# Patient Record
Sex: MALE | Race: WHITE | NOT HISPANIC OR LATINO | Employment: UNEMPLOYED | URBAN - METROPOLITAN AREA
[De-identification: names, ages, dates, MRNs, and addresses within clinical notes are randomized per-mention and may not be internally consistent; named-entity substitution may affect disease eponyms.]

---

## 2022-10-04 ENCOUNTER — TELEPHONE (OUTPATIENT)
Dept: TRANSPLANT | Facility: CLINIC | Age: 47
End: 2022-10-04

## 2022-10-04 DIAGNOSIS — I50.9 CONGESTIVE HEART FAILURE, UNSPECIFIED HF CHRONICITY, UNSPECIFIED HEART FAILURE TYPE: Primary | ICD-10-CM

## 2022-10-17 ENCOUNTER — LAB VISIT (OUTPATIENT)
Dept: LAB | Facility: HOSPITAL | Age: 47
End: 2022-10-17
Attending: INTERNAL MEDICINE
Payer: MEDICAID

## 2022-10-17 ENCOUNTER — OFFICE VISIT (OUTPATIENT)
Dept: TRANSPLANT | Facility: CLINIC | Age: 47
End: 2022-10-17
Payer: MEDICAID

## 2022-10-17 VITALS
SYSTOLIC BLOOD PRESSURE: 135 MMHG | BODY MASS INDEX: 27.13 KG/M2 | DIASTOLIC BLOOD PRESSURE: 77 MMHG | HEIGHT: 71 IN | WEIGHT: 193.81 LBS | HEART RATE: 80 BPM

## 2022-10-17 DIAGNOSIS — I50.9 CONGESTIVE HEART FAILURE, UNSPECIFIED HF CHRONICITY, UNSPECIFIED HEART FAILURE TYPE: ICD-10-CM

## 2022-10-17 DIAGNOSIS — N18.31 STAGE 3A CHRONIC KIDNEY DISEASE: ICD-10-CM

## 2022-10-17 DIAGNOSIS — F17.201 TOBACCO ABUSE, IN REMISSION: ICD-10-CM

## 2022-10-17 DIAGNOSIS — I48.91 ATRIAL FIBRILLATION, UNSPECIFIED TYPE: ICD-10-CM

## 2022-10-17 DIAGNOSIS — Z95.1 HX OF CABG: ICD-10-CM

## 2022-10-17 DIAGNOSIS — F19.21 POLYSUBSTANCE DEPENDENCE, NON-OPIOID, IN REMISSION: ICD-10-CM

## 2022-10-17 DIAGNOSIS — I21.9 MYOCARDIAL INFARCTION, UNSPECIFIED MI TYPE, UNSPECIFIED ARTERY: ICD-10-CM

## 2022-10-17 DIAGNOSIS — I50.42 CHRONIC COMBINED SYSTOLIC AND DIASTOLIC HEART FAILURE: Primary | ICD-10-CM

## 2022-10-17 DIAGNOSIS — R26.2 AMBULATORY DYSFUNCTION: ICD-10-CM

## 2022-10-17 DIAGNOSIS — I25.5 ISCHEMIC CARDIOMYOPATHY: ICD-10-CM

## 2022-10-17 DIAGNOSIS — Z86.39 HISTORY OF DIABETES MELLITUS: ICD-10-CM

## 2022-10-17 DIAGNOSIS — D63.8 ANEMIA IN OTHER CHRONIC DISEASES CLASSIFIED ELSEWHERE: ICD-10-CM

## 2022-10-17 DIAGNOSIS — Z98.890 STATUS POST CREATION OF PERICARDIAL WINDOW: ICD-10-CM

## 2022-10-17 DIAGNOSIS — I25.118 CORONARY ARTERY DISEASE OF NATIVE ARTERY OF NATIVE HEART WITH STABLE ANGINA PECTORIS: ICD-10-CM

## 2022-10-17 LAB
ALBUMIN SERPL BCP-MCNC: 3.8 G/DL (ref 3.5–5.2)
ALP SERPL-CCNC: 91 U/L (ref 55–135)
ALT SERPL W/O P-5'-P-CCNC: 9 U/L (ref 10–44)
ANION GAP SERPL CALC-SCNC: 10 MMOL/L (ref 8–16)
AST SERPL-CCNC: 17 U/L (ref 10–40)
BILIRUB SERPL-MCNC: 0.9 MG/DL (ref 0.1–1)
BNP SERPL-MCNC: 1753 PG/ML (ref 0–99)
BUN SERPL-MCNC: 43 MG/DL (ref 6–20)
CALCIUM SERPL-MCNC: 9.9 MG/DL (ref 8.7–10.5)
CHLORIDE SERPL-SCNC: 104 MMOL/L (ref 95–110)
CO2 SERPL-SCNC: 23 MMOL/L (ref 23–29)
CREAT SERPL-MCNC: 1.7 MG/DL (ref 0.5–1.4)
EST. GFR  (NO RACE VARIABLE): 49.1 ML/MIN/1.73 M^2
GLUCOSE SERPL-MCNC: 91 MG/DL (ref 70–110)
MAGNESIUM SERPL-MCNC: 2.2 MG/DL (ref 1.6–2.6)
POTASSIUM SERPL-SCNC: 3.8 MMOL/L (ref 3.5–5.1)
PROT SERPL-MCNC: 7 G/DL (ref 6–8.4)
SODIUM SERPL-SCNC: 137 MMOL/L (ref 136–145)
TSH SERPL DL<=0.005 MIU/L-ACNC: 1.65 UIU/ML (ref 0.4–4)

## 2022-10-17 PROCEDURE — 99214 OFFICE O/P EST MOD 30 MIN: CPT | Mod: PBBFAC | Performed by: INTERNAL MEDICINE

## 2022-10-17 PROCEDURE — 3075F SYST BP GE 130 - 139MM HG: CPT | Mod: CPTII,,, | Performed by: INTERNAL MEDICINE

## 2022-10-17 PROCEDURE — 84443 ASSAY THYROID STIM HORMONE: CPT | Performed by: INTERNAL MEDICINE

## 2022-10-17 PROCEDURE — 99999 PR PBB SHADOW E&M-EST. PATIENT-LVL IV: CPT | Mod: PBBFAC,,, | Performed by: INTERNAL MEDICINE

## 2022-10-17 PROCEDURE — 80053 COMPREHEN METABOLIC PANEL: CPT | Performed by: INTERNAL MEDICINE

## 2022-10-17 PROCEDURE — 3075F PR MOST RECENT SYSTOLIC BLOOD PRESS GE 130-139MM HG: ICD-10-PCS | Mod: CPTII,,, | Performed by: INTERNAL MEDICINE

## 2022-10-17 PROCEDURE — 83735 ASSAY OF MAGNESIUM: CPT | Performed by: INTERNAL MEDICINE

## 2022-10-17 PROCEDURE — 1160F RVW MEDS BY RX/DR IN RCRD: CPT | Mod: CPTII,,, | Performed by: INTERNAL MEDICINE

## 2022-10-17 PROCEDURE — 99204 OFFICE O/P NEW MOD 45 MIN: CPT | Mod: S$PBB,,, | Performed by: INTERNAL MEDICINE

## 2022-10-17 PROCEDURE — 3078F PR MOST RECENT DIASTOLIC BLOOD PRESSURE < 80 MM HG: ICD-10-PCS | Mod: CPTII,,, | Performed by: INTERNAL MEDICINE

## 2022-10-17 PROCEDURE — 99999 PR PBB SHADOW E&M-EST. PATIENT-LVL IV: ICD-10-PCS | Mod: PBBFAC,,, | Performed by: INTERNAL MEDICINE

## 2022-10-17 PROCEDURE — 1159F MED LIST DOCD IN RCRD: CPT | Mod: CPTII,,, | Performed by: INTERNAL MEDICINE

## 2022-10-17 PROCEDURE — 99204 PR OFFICE/OUTPT VISIT, NEW, LEVL IV, 45-59 MIN: ICD-10-PCS | Mod: S$PBB,,, | Performed by: INTERNAL MEDICINE

## 2022-10-17 PROCEDURE — 83880 ASSAY OF NATRIURETIC PEPTIDE: CPT | Performed by: INTERNAL MEDICINE

## 2022-10-17 PROCEDURE — 36415 COLL VENOUS BLD VENIPUNCTURE: CPT | Performed by: INTERNAL MEDICINE

## 2022-10-17 PROCEDURE — 4010F ACE/ARB THERAPY RXD/TAKEN: CPT | Mod: CPTII,,, | Performed by: INTERNAL MEDICINE

## 2022-10-17 PROCEDURE — 1160F PR REVIEW ALL MEDS BY PRESCRIBER/CLIN PHARMACIST DOCUMENTED: ICD-10-PCS | Mod: CPTII,,, | Performed by: INTERNAL MEDICINE

## 2022-10-17 PROCEDURE — 1159F PR MEDICATION LIST DOCUMENTED IN MEDICAL RECORD: ICD-10-PCS | Mod: CPTII,,, | Performed by: INTERNAL MEDICINE

## 2022-10-17 PROCEDURE — 4010F PR ACE/ARB THEARPY RXD/TAKEN: ICD-10-PCS | Mod: CPTII,,, | Performed by: INTERNAL MEDICINE

## 2022-10-17 PROCEDURE — 3078F DIAST BP <80 MM HG: CPT | Mod: CPTII,,, | Performed by: INTERNAL MEDICINE

## 2022-10-17 RX ORDER — ONDANSETRON HYDROCHLORIDE 8 MG/1
8 TABLET, FILM COATED ORAL 2 TIMES DAILY
COMMUNITY
End: 2023-07-07

## 2022-10-17 RX ORDER — VALSARTAN 40 MG/1
40 TABLET ORAL 2 TIMES DAILY
Qty: 180 TABLET | Refills: 3 | Status: SHIPPED | OUTPATIENT
Start: 2022-10-17 | End: 2022-10-25

## 2022-10-17 RX ORDER — PANTOPRAZOLE SODIUM 40 MG/1
40 TABLET, DELAYED RELEASE ORAL DAILY
COMMUNITY
Start: 2022-09-13 | End: 2023-07-07

## 2022-10-17 RX ORDER — POTASSIUM CHLORIDE 20 MEQ/1
20 TABLET, EXTENDED RELEASE ORAL 4 TIMES DAILY
COMMUNITY
Start: 2022-09-13 | End: 2022-10-17

## 2022-10-17 RX ORDER — METOPROLOL SUCCINATE 25 MG/1
12.5 TABLET, EXTENDED RELEASE ORAL 2 TIMES DAILY
Qty: 30 TABLET | Refills: 11 | Status: SHIPPED | OUTPATIENT
Start: 2022-10-17 | End: 2023-03-06 | Stop reason: SDUPTHER

## 2022-10-17 RX ORDER — BECLOMETHASONE DIPROPIONATE HFA 40 UG/1
80 AEROSOL, METERED RESPIRATORY (INHALATION) 2 TIMES DAILY
COMMUNITY

## 2022-10-17 RX ORDER — SPIRONOLACTONE 25 MG/1
25 TABLET ORAL DAILY
Qty: 30 TABLET | Refills: 11 | Status: SHIPPED | OUTPATIENT
Start: 2022-10-17 | End: 2022-10-25

## 2022-10-17 RX ORDER — FUROSEMIDE 40 MG/1
40 TABLET ORAL 2 TIMES DAILY
COMMUNITY
Start: 2022-09-13 | End: 2022-10-17 | Stop reason: SDUPTHER

## 2022-10-17 RX ORDER — ESCITALOPRAM OXALATE 20 MG/1
20 TABLET ORAL DAILY
COMMUNITY
Start: 2022-10-03

## 2022-10-17 RX ORDER — CLOPIDOGREL BISULFATE 75 MG/1
75 TABLET ORAL DAILY
COMMUNITY
End: 2023-07-07

## 2022-10-17 RX ORDER — FUROSEMIDE 40 MG/1
80 TABLET ORAL 2 TIMES DAILY
Qty: 90 TABLET | Refills: 3 | Status: SHIPPED | OUTPATIENT
Start: 2022-10-17 | End: 2022-10-25

## 2022-10-17 RX ORDER — DIGOXIN 125 MCG
0.12 TABLET ORAL DAILY
Status: ON HOLD | COMMUNITY
Start: 2022-09-13 | End: 2023-10-05 | Stop reason: HOSPADM

## 2022-10-17 RX ORDER — FERROUS SULFATE 325(65) MG
325 TABLET, DELAYED RELEASE (ENTERIC COATED) ORAL DAILY
COMMUNITY

## 2022-10-17 RX ORDER — ISOSORBIDE MONONITRATE 30 MG/1
30 TABLET, EXTENDED RELEASE ORAL DAILY
COMMUNITY
End: 2023-09-26 | Stop reason: SDUPTHER

## 2022-10-17 RX ORDER — CALCITRIOL 0.25 UG/1
0.25 CAPSULE ORAL
COMMUNITY
End: 2023-07-07

## 2022-10-17 RX ORDER — ATORVASTATIN CALCIUM 40 MG/1
40 TABLET, FILM COATED ORAL DAILY
COMMUNITY
Start: 2022-10-03

## 2022-10-17 NOTE — PATIENT INSTRUCTIONS
Follow new medication changes prescribed here. Stop taking metoprolol tartrate and potassium supplements    Recommend 2 gram sodium restriction and 1500cc fluid restriction.  Encourage physical activity with graded exercise program.  Requested patient to weigh themselves daily, and to notify us if their weight increases by more than 3 lbs in 1 day or 5 lbs in 1 week.
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
## 2022-10-24 ENCOUNTER — TELEPHONE (OUTPATIENT)
Dept: TRANSPLANT | Facility: CLINIC | Age: 47
End: 2022-10-24
Payer: MEDICAID

## 2022-10-24 NOTE — TELEPHONE ENCOUNTER
"3:30 pm:  f/U on todays nurse lab reminder   See Dr. Daniel's 10/17/22 clinic note w/ reason for this lab  Pt had outside BMP and NTproBNP done today and just received results  Na/K:  138/4.3    Cl/Co2;  100  /25    Bun/Cr:  68/2.6  On 10/17:  bun/Cr:  43/1.7  Today had NtproBNP:  4233    Called and spoke with pt at this time for assessment:  Meds:  Pt is taking Valsartan 40 mg bid  (increased 10/18)  Furosemide 80 mg BID (increased 10/18)  Spironolactone 25 mg once daily ( started 10/18)  HF assessment:  Weighs dly ,but does not write them down ( asked pt to keep a every am log and will do ) pt certain home wt day of visit 10/17 was:  188.4 and today 186.5   Not lower last week, but has been higher)   Still w/ some heavy breathing w/ activity, but better  Initially U.O. increased w/ Lasix increase, but has slowed up  No peripheral edema  Slight abdominal distention-none right now    In addition: pt reports he  gets dizzy when gets up sometimes and also "balance is off at times"  These 2 symptoms started post 10/17/22 clinic visit    Told pt I was going to go to the clinic to see if Dr. Daniel available to review all with her , if not, she may not get back with me until after hours. Told pt if I do not speak with him today to not take the pm dose of Valsartan and Furosemide today until we hear from Dr. Daniel -pt voice understanding  As a side:  asked pt and he usually take furosemide 7 am and 8 pm-told pt he can take his 2nd furosemide dose @ 3 or 4  rather than late . ( But not today)      To clinic and then to office to review w/ Dr. Daniel,: I was  told she just left for the day  Sent her a phone message w/ pts bun/cr values today from outside lab and these values are up from 10/17, also that I have instructed pt to hold Valsartan and Lasix this evening   IN addition, left in phone message I will enter all in a note and route to her for her review and plan.   "

## 2022-10-25 DIAGNOSIS — I50.42 CHRONIC COMBINED SYSTOLIC AND DIASTOLIC HEART FAILURE: ICD-10-CM

## 2022-10-25 RX ORDER — SPIRONOLACTONE 25 MG/1
25 TABLET ORAL DAILY
Qty: 30 TABLET | Refills: 11
Start: 2022-10-25 | End: 2023-01-27

## 2022-10-25 RX ORDER — FUROSEMIDE 40 MG/1
80 TABLET ORAL 2 TIMES DAILY
Qty: 90 TABLET | Refills: 3
Start: 2022-10-25 | End: 2022-10-27

## 2022-10-25 RX ORDER — VALSARTAN 40 MG/1
40 TABLET ORAL DAILY
Qty: 90 TABLET | Refills: 3
Start: 2022-10-25 | End: 2023-03-06 | Stop reason: SDUPTHER

## 2022-10-25 NOTE — TELEPHONE ENCOUNTER
1130 am:  Discussed further with dr. Fredy RAMIREZ: Dr. Pimentel/Sid, RN:    Decrease Valsartan to 40 mg once daily starting today, stop taking spironolactone for now, do not take Lasix until after review of lab 10/27. Repeat BMP and ntproBNP this Thursday 10/27 am  Called pt and reviewed all of these orders in detail and pt was able to repeat back to me correctly  Pt in agreement to have lab work this Thursday 10/27 and requests to be done by [facility].  Told pt needed to have done early in the am and will have our medical assistant to arrange this  pt in agreement to am lab in that he has an afternoon  that day  Message sent to [facility] to arrange this lab by  and if not able to do so that am to let me know    Additionally assessment from this am:  Wt:  185.4 ( down a pound)  stated still w/ dizziness w. Change of position , but not as bad    Asked pt to strictly follow the 50 ounces of liquids per 24 hour especially since Furosemide on hold as well as a 2000 mg /24 hour sodium restriction  pt agreed to do so.   Asked pt to notify this office for a wt gain > 3 pounds overnight or any other signs of fluid  pt agreed    Confirmed yesterday and again today, pt has this office phone number. If needed.

## 2022-10-27 DIAGNOSIS — I50.42 CHRONIC COMBINED SYSTOLIC AND DIASTOLIC HEART FAILURE: ICD-10-CM

## 2022-10-27 RX ORDER — FUROSEMIDE 40 MG/1
80 TABLET ORAL 2 TIMES DAILY
Qty: 90 TABLET | Refills: 3
Start: 2022-10-27 | End: 2023-01-27

## 2022-10-27 NOTE — TELEPHONE ENCOUNTER
2;40 pm:  F/U on [facility] nurse lab phone reminder  See my previous notes w/ orders  Outside labs received today from [facility] collection:  Na/K:  140/4.5    Cl/Co2:  105/25     Bun/Cr:  42/1.75  Called and spoke w/ pt at this time for assessment pt confirmed he is taking Valsartan 40 mg once daily and has not taken Spironolactone or Lasix as directed  Wts: 10/25:  185.4  10/26:  187.4  1027 : today;  191.6  No sob, or swelling   Does have decreased u.o.    2:50 pm  Reviewed w/ Dr. Fredy RAMIREZ: Dr. Daniel/Sid, RN: continue Valsartan 40 mg once daily     Resume Lasis 80 mg BID ( and had confirmed with pt , this is the dose he was taking)   Continue to hold Spironolactone for now    Repeat BMP NTproBNP 10/31/22     10/27/22  3:00 pm: Called pt and reviewed these instructions in detail  He was able to repeat back to me correctly   Will have HH draw labs 10/31/22 am.     1027/22 3:30 pm:  Message to HF LAURA Mark to arrange HH labs as above 10/31/22 w/ reason and add HF assessment to that lab reminder.

## 2022-10-31 ENCOUNTER — TELEPHONE (OUTPATIENT)
Dept: TRANSPLANT | Facility: CLINIC | Age: 47
End: 2022-10-31
Payer: MEDICAID

## 2022-10-31 NOTE — TELEPHONE ENCOUNTER
Spoke with patient today, Informed him that the outside labs have been received and reviewed.  Weights since Friday are 189.0, Saturday 189.4 lb, Sunday 191.4 lb, and today 189.6 lb. States he does not noted any swelling in his legs or abdomen at this time. No shortness of breath unless he is carrying something or exerting himself.   States he is taking 2 40 mg (80 mg total) lasix bib, Valsartan 50 mg once a day. States he is not taking spironalactone, the last dose was on 10/25/22.          ---- Message from Carolina Vargas RN sent at 10/31/2022 12:35 PM CDT -----  Please call pt for HF assessment  -wts,breathing, edema, etc.  Let him know I have received and reviewed his outside labs from this am.     See my recent NN    Labs today from outside w/ slightly higher cr? 1.92    Confirm w/ pt taking Valsartan 40  once daily  Ask pt dose and how often he is taking lasix   And verify he has been holding Spironolactone    Please doc all and let me know when done: I will review and report out    Thanks  Madelyn

## 2022-10-31 NOTE — TELEPHONE ENCOUNTER
1230 pm:  F/U on todays nurse lab phone reminder  Just received outside lab results  Na/K:  135/3.5     Cl/CO2:  97/26     Bun/Cr:  36/1.92  Nt proBNP:  6138    See my previous nn w/ labs/meds, etc    Alem RN calling pt for HF assessment w/ wts,symptoms, meds, etc.     Here is her assessment:  Wt still up from 10/25 : 165 -186 range    Spoke with patient today, Informed him that the outside labs have been received and reviewed.  Weights since Friday are 189.0, Saturday 189.4 lb, Sunday 191.4 lb, and today 189.6 lb. States he does not noted any swelling in his legs or abdomen at this time. No shortness of breath unless he is carrying something or exerting himself.   States he is taking 2 40 mg (80 mg total) lasix bib, Valsartan 50 mg once a day. States he is not taking spironalactone, the last dose was on 10/25/22.    Pt is taking Lasix 80 mg bid and Valsartan 40 ( not 50 mg ) once daily   is not taking aldactone    Due to RTC 11/18/22 to see Dr. Daniel    Will route to Dr. Daniel for her review upon her return to the office 11/2/22

## 2022-12-15 ENCOUNTER — PATIENT MESSAGE (OUTPATIENT)
Dept: ADMINISTRATIVE | Facility: OTHER | Age: 47
End: 2022-12-15
Payer: MEDICAID

## 2023-01-16 PROBLEM — I21.9 MI (MYOCARDIAL INFARCTION): Status: RESOLVED | Noted: 2022-10-17 | Resolved: 2023-01-16
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
## 2023-01-27 ENCOUNTER — LAB VISIT (OUTPATIENT)
Dept: LAB | Facility: HOSPITAL | Age: 48
End: 2023-01-27
Attending: INTERNAL MEDICINE
Payer: MEDICAID

## 2023-01-27 ENCOUNTER — OFFICE VISIT (OUTPATIENT)
Dept: TRANSPLANT | Facility: CLINIC | Age: 48
End: 2023-01-27
Payer: MEDICAID

## 2023-01-27 ENCOUNTER — TELEPHONE (OUTPATIENT)
Dept: TRANSPLANT | Facility: CLINIC | Age: 48
End: 2023-01-27

## 2023-01-27 VITALS
HEART RATE: 83 BPM | BODY MASS INDEX: 29.63 KG/M2 | SYSTOLIC BLOOD PRESSURE: 137 MMHG | HEIGHT: 71 IN | DIASTOLIC BLOOD PRESSURE: 43 MMHG | WEIGHT: 211.63 LBS

## 2023-01-27 DIAGNOSIS — I25.118 CORONARY ARTERY DISEASE OF NATIVE ARTERY OF NATIVE HEART WITH STABLE ANGINA PECTORIS: ICD-10-CM

## 2023-01-27 DIAGNOSIS — Z98.890 STATUS POST CREATION OF PERICARDIAL WINDOW: ICD-10-CM

## 2023-01-27 DIAGNOSIS — D63.8 ANEMIA IN OTHER CHRONIC DISEASES CLASSIFIED ELSEWHERE: ICD-10-CM

## 2023-01-27 DIAGNOSIS — Z86.39 HISTORY OF DIABETES MELLITUS: ICD-10-CM

## 2023-01-27 DIAGNOSIS — F17.201 TOBACCO ABUSE, IN REMISSION: ICD-10-CM

## 2023-01-27 DIAGNOSIS — Z95.1 HX OF CABG: ICD-10-CM

## 2023-01-27 DIAGNOSIS — I50.42 CHRONIC COMBINED SYSTOLIC AND DIASTOLIC HEART FAILURE: ICD-10-CM

## 2023-01-27 DIAGNOSIS — I48.91 ATRIAL FIBRILLATION, UNSPECIFIED TYPE: ICD-10-CM

## 2023-01-27 DIAGNOSIS — N18.31 STAGE 3A CHRONIC KIDNEY DISEASE: ICD-10-CM

## 2023-01-27 DIAGNOSIS — F19.21 POLYSUBSTANCE DEPENDENCE, NON-OPIOID, IN REMISSION: ICD-10-CM

## 2023-01-27 DIAGNOSIS — I50.42 CHRONIC COMBINED SYSTOLIC AND DIASTOLIC HEART FAILURE: Primary | ICD-10-CM

## 2023-01-27 DIAGNOSIS — I25.5 ISCHEMIC CARDIOMYOPATHY: ICD-10-CM

## 2023-01-27 LAB
ALBUMIN SERPL BCP-MCNC: 4 G/DL (ref 3.5–5.2)
ALP SERPL-CCNC: 106 U/L (ref 55–135)
ALT SERPL W/O P-5'-P-CCNC: 12 U/L (ref 10–44)
ANION GAP SERPL CALC-SCNC: 11 MMOL/L (ref 8–16)
AST SERPL-CCNC: 20 U/L (ref 10–40)
BASOPHILS # BLD AUTO: 0.1 K/UL (ref 0–0.2)
BASOPHILS NFR BLD: 1.1 % (ref 0–1.9)
BILIRUB SERPL-MCNC: 1.1 MG/DL (ref 0.1–1)
BNP SERPL-MCNC: 1772 PG/ML (ref 0–99)
BUN SERPL-MCNC: 33 MG/DL (ref 6–20)
CALCIUM SERPL-MCNC: 9.4 MG/DL (ref 8.7–10.5)
CHLORIDE SERPL-SCNC: 100 MMOL/L (ref 95–110)
CO2 SERPL-SCNC: 25 MMOL/L (ref 23–29)
CREAT SERPL-MCNC: 1.7 MG/DL (ref 0.5–1.4)
DIFFERENTIAL METHOD: ABNORMAL
EOSINOPHIL # BLD AUTO: 0.3 K/UL (ref 0–0.5)
EOSINOPHIL NFR BLD: 2.9 % (ref 0–8)
ERYTHROCYTE [DISTWIDTH] IN BLOOD BY AUTOMATED COUNT: 19.3 % (ref 11.5–14.5)
EST. GFR  (NO RACE VARIABLE): 49.1 ML/MIN/1.73 M^2
GLUCOSE SERPL-MCNC: 89 MG/DL (ref 70–110)
HCT VFR BLD AUTO: 41 % (ref 40–54)
HGB BLD-MCNC: 12.6 G/DL (ref 14–18)
IMM GRANULOCYTES # BLD AUTO: 0.01 K/UL (ref 0–0.04)
IMM GRANULOCYTES NFR BLD AUTO: 0.1 % (ref 0–0.5)
LYMPHOCYTES # BLD AUTO: 1.7 K/UL (ref 1–4.8)
LYMPHOCYTES NFR BLD: 18 % (ref 18–48)
MAGNESIUM SERPL-MCNC: 2.3 MG/DL (ref 1.6–2.6)
MCH RBC QN AUTO: 23.6 PG (ref 27–31)
MCHC RBC AUTO-ENTMCNC: 30.7 G/DL (ref 32–36)
MCV RBC AUTO: 77 FL (ref 82–98)
MONOCYTES # BLD AUTO: 1.1 K/UL (ref 0.3–1)
MONOCYTES NFR BLD: 11.1 % (ref 4–15)
NEUTROPHILS # BLD AUTO: 6.3 K/UL (ref 1.8–7.7)
NEUTROPHILS NFR BLD: 66.8 % (ref 38–73)
NRBC BLD-RTO: 0 /100 WBC
PLATELET # BLD AUTO: 274 K/UL (ref 150–450)
PMV BLD AUTO: 10.8 FL (ref 9.2–12.9)
POTASSIUM SERPL-SCNC: 3.6 MMOL/L (ref 3.5–5.1)
PROT SERPL-MCNC: 7.7 G/DL (ref 6–8.4)
RBC # BLD AUTO: 5.35 M/UL (ref 4.6–6.2)
SODIUM SERPL-SCNC: 136 MMOL/L (ref 136–145)
WBC # BLD AUTO: 9.43 K/UL (ref 3.9–12.7)

## 2023-01-27 PROCEDURE — 3008F PR BODY MASS INDEX (BMI) DOCUMENTED: ICD-10-PCS | Mod: CPTII,,, | Performed by: INTERNAL MEDICINE

## 2023-01-27 PROCEDURE — 1159F PR MEDICATION LIST DOCUMENTED IN MEDICAL RECORD: ICD-10-PCS | Mod: CPTII,,, | Performed by: INTERNAL MEDICINE

## 2023-01-27 PROCEDURE — 99213 OFFICE O/P EST LOW 20 MIN: CPT | Mod: PBBFAC | Performed by: INTERNAL MEDICINE

## 2023-01-27 PROCEDURE — 36415 COLL VENOUS BLD VENIPUNCTURE: CPT | Performed by: INTERNAL MEDICINE

## 2023-01-27 PROCEDURE — 99214 PR OFFICE/OUTPT VISIT, EST, LEVL IV, 30-39 MIN: ICD-10-PCS | Mod: S$PBB,,, | Performed by: INTERNAL MEDICINE

## 2023-01-27 PROCEDURE — 1159F MED LIST DOCD IN RCRD: CPT | Mod: CPTII,,, | Performed by: INTERNAL MEDICINE

## 2023-01-27 PROCEDURE — 85025 COMPLETE CBC W/AUTO DIFF WBC: CPT | Performed by: INTERNAL MEDICINE

## 2023-01-27 PROCEDURE — 83880 ASSAY OF NATRIURETIC PEPTIDE: CPT | Performed by: INTERNAL MEDICINE

## 2023-01-27 PROCEDURE — 3075F SYST BP GE 130 - 139MM HG: CPT | Mod: CPTII,,, | Performed by: INTERNAL MEDICINE

## 2023-01-27 PROCEDURE — 3008F BODY MASS INDEX DOCD: CPT | Mod: CPTII,,, | Performed by: INTERNAL MEDICINE

## 2023-01-27 PROCEDURE — 3078F DIAST BP <80 MM HG: CPT | Mod: CPTII,,, | Performed by: INTERNAL MEDICINE

## 2023-01-27 PROCEDURE — 3075F PR MOST RECENT SYSTOLIC BLOOD PRESS GE 130-139MM HG: ICD-10-PCS | Mod: CPTII,,, | Performed by: INTERNAL MEDICINE

## 2023-01-27 PROCEDURE — 4010F PR ACE/ARB THEARPY RXD/TAKEN: ICD-10-PCS | Mod: CPTII,,, | Performed by: INTERNAL MEDICINE

## 2023-01-27 PROCEDURE — 4010F ACE/ARB THERAPY RXD/TAKEN: CPT | Mod: CPTII,,, | Performed by: INTERNAL MEDICINE

## 2023-01-27 PROCEDURE — 99214 OFFICE O/P EST MOD 30 MIN: CPT | Mod: S$PBB,,, | Performed by: INTERNAL MEDICINE

## 2023-01-27 PROCEDURE — 1160F RVW MEDS BY RX/DR IN RCRD: CPT | Mod: CPTII,,, | Performed by: INTERNAL MEDICINE

## 2023-01-27 PROCEDURE — 1160F PR REVIEW ALL MEDS BY PRESCRIBER/CLIN PHARMACIST DOCUMENTED: ICD-10-PCS | Mod: CPTII,,, | Performed by: INTERNAL MEDICINE

## 2023-01-27 PROCEDURE — 80053 COMPREHEN METABOLIC PANEL: CPT | Performed by: INTERNAL MEDICINE

## 2023-01-27 PROCEDURE — 83735 ASSAY OF MAGNESIUM: CPT | Performed by: INTERNAL MEDICINE

## 2023-01-27 PROCEDURE — 3078F PR MOST RECENT DIASTOLIC BLOOD PRESSURE < 80 MM HG: ICD-10-PCS | Mod: CPTII,,, | Performed by: INTERNAL MEDICINE

## 2023-01-27 PROCEDURE — 99999 PR PBB SHADOW E&M-EST. PATIENT-LVL III: CPT | Mod: PBBFAC,,, | Performed by: INTERNAL MEDICINE

## 2023-01-27 PROCEDURE — 99999 PR PBB SHADOW E&M-EST. PATIENT-LVL III: ICD-10-PCS | Mod: PBBFAC,,, | Performed by: INTERNAL MEDICINE

## 2023-01-27 RX ORDER — DIAZEPAM 2 MG/1
2 TABLET ORAL 3 TIMES DAILY PRN
COMMUNITY
Start: 2022-11-09

## 2023-01-27 RX ORDER — DAPAGLIFLOZIN 10 MG/1
10 TABLET, FILM COATED ORAL DAILY
Qty: 30 TABLET | Refills: 3 | Status: SHIPPED | OUTPATIENT
Start: 2023-01-27 | End: 2023-09-26 | Stop reason: SDUPTHER

## 2023-01-27 RX ORDER — SPIRONOLACTONE 25 MG/1
25 TABLET ORAL DAILY
Qty: 30 TABLET | Refills: 11
Start: 2023-01-27 | End: 2023-04-04

## 2023-01-27 RX ORDER — FUROSEMIDE 40 MG/1
80 TABLET ORAL DAILY
Qty: 90 TABLET | Refills: 3
Start: 2023-01-27 | End: 2023-04-04

## 2023-01-27 NOTE — TELEPHONE ENCOUNTER
2:00 pm:   Received and reviewed this message  Just called and spoke w/ pt   He was driving home.  Told pt whom I am, office w/ and reason for call is that Dr. Daniel reviewed his labs which resulted after he left the clinic and Dr. Daniel wanted to make medication changes.    Asked pt if someone was with him for me to give new medication information to him and he said there was  Informed pt to restart Spironolactone 25 mg once daily and decrease Lasix to 80 mg once daily once he starts it  Asked after noting neither medications routed to a pharmacy and pt told me he does have Spironolactone in his home and does not need a prescription   Pt voiced understanding of both of these directions.     Told pt she would like to start a new medication: and spelled it w/ directions : Farxiga 10 mg once daily and told pt what this is for  And was sent to the [facility] in [place] on [address] and he said that was the correct place    Informed pt our appt  told me she is sending lab request to his HH agency and asking they draw lab work on Thursday of next week , rather than the scheduled day they see pt on Friday so we will have a better chance of getting the results. Pt understood and will also follow up re this w/ HH    Pt voiced understanding of all directions and has no questions.    Asked pt to weigh daily and notify this office for a wt gain >3 pds o/n and/or > 5 pds in a week or any other signs of fluid : inc sob, heavy breathing swelling to leg/ankles/feet or abdomen and pt agreed to do so           ----- Message from Monique Daniel DO sent at 1/27/2023  1:41 PM CST -----  Regarding: Labs  Labs just returned on Mr. Lopez. Will restart spironolactone with reducing Lasix to 80 mg daily but also start Farxiga 10 mg daily this time and follow repeat labs locally next week.    Marnie, can you help schedule local labs next week?    Thanks,  Monique

## 2023-02-03 ENCOUNTER — TELEPHONE (OUTPATIENT)
Dept: TRANSPLANT | Facility: CLINIC | Age: 48
End: 2023-02-03
Payer: MEDICAID

## 2023-02-03 NOTE — TELEPHONE ENCOUNTER
3:15 pm:  f/u on yesterdays nurse lab phone reminder  Received outside lab results today via fax    See Dr. Daniel's 1/27 clinic note and my 1/27/23 NN    Resutls from 2/2/23 are:  Na/K:  138/4.5    Cl/Co2:  100/30.9    bun/Cr:  46/2.03  T. Bili:  0.86   ntproBNP:  3480      Noted Cr:  1/27 : 1.7  BNP done not nt pro bnp on 1/27.    Just placed call to pt to review lab results and obtain assessment and review med changes  NA at 1st number list, lvm stating my name, office w/ , day, date time and important he return my call to review lab results which have changed and see how he is doing  2nd number list id out of service  3rd number listed is [facility] and I did ask if they service this pt and they do not.

## 2023-03-02 ENCOUNTER — DOCUMENT SCAN (OUTPATIENT)
Dept: HOME HEALTH SERVICES | Facility: HOSPITAL | Age: 48
End: 2023-03-02
Payer: MEDICAID
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
## 2023-03-06 ENCOUNTER — HOSPITAL ENCOUNTER (OUTPATIENT)
Dept: CARDIOLOGY | Facility: HOSPITAL | Age: 48
Discharge: HOME | End: 2023-03-06
Attending: INTERNAL MEDICINE
Payer: MEDICAID

## 2023-03-06 ENCOUNTER — OFFICE VISIT (OUTPATIENT)
Dept: TRANSPLANT | Facility: CLINIC | Age: 48
End: 2023-03-06
Payer: MEDICAID

## 2023-03-06 VITALS
HEART RATE: 54 BPM | WEIGHT: 220 LBS | BODY MASS INDEX: 30.8 KG/M2 | SYSTOLIC BLOOD PRESSURE: 150 MMHG | DIASTOLIC BLOOD PRESSURE: 82 MMHG | HEIGHT: 71 IN

## 2023-03-06 DIAGNOSIS — I25.118 CORONARY ARTERY DISEASE OF NATIVE ARTERY OF NATIVE HEART WITH STABLE ANGINA PECTORIS: ICD-10-CM

## 2023-03-06 DIAGNOSIS — Z86.39 HISTORY OF DIABETES MELLITUS: ICD-10-CM

## 2023-03-06 DIAGNOSIS — I50.42 CHRONIC COMBINED SYSTOLIC AND DIASTOLIC HEART FAILURE: ICD-10-CM

## 2023-03-06 DIAGNOSIS — N18.31 STAGE 3A CHRONIC KIDNEY DISEASE: ICD-10-CM

## 2023-03-06 DIAGNOSIS — Z95.1 HX OF CABG: ICD-10-CM

## 2023-03-06 DIAGNOSIS — F19.21 POLYSUBSTANCE DEPENDENCE, NON-OPIOID, IN REMISSION: ICD-10-CM

## 2023-03-06 DIAGNOSIS — Z98.890 STATUS POST CREATION OF PERICARDIAL WINDOW: ICD-10-CM

## 2023-03-06 DIAGNOSIS — F17.201 TOBACCO ABUSE, IN REMISSION: ICD-10-CM

## 2023-03-06 DIAGNOSIS — I25.5 ISCHEMIC CARDIOMYOPATHY: ICD-10-CM

## 2023-03-06 DIAGNOSIS — I50.42 CHRONIC COMBINED SYSTOLIC AND DIASTOLIC HEART FAILURE: Primary | ICD-10-CM

## 2023-03-06 DIAGNOSIS — I48.91 ATRIAL FIBRILLATION, UNSPECIFIED TYPE: ICD-10-CM

## 2023-03-06 LAB
ASCENDING AORTA: 3.34 CM
AV INDEX (PROSTH): 0.54
AV MEAN GRADIENT: 4 MMHG
AV PEAK GRADIENT: 7 MMHG
AV VALVE AREA: 2.25 CM2
AV VELOCITY RATIO: 0.56
CV ECHO LV RWT: 0.37 CM
DOP CALC AO PEAK VEL: 1.35 M/S
DOP CALC AO VTI: 23.11 CM
DOP CALC LVOT AREA: 4.2 CM2
DOP CALC LVOT DIAMETER: 2.31 CM
DOP CALC LVOT PEAK VEL: 0.75 M/S
DOP CALC LVOT STROKE VOLUME: 51.94 CM3
DOP CALCLVOT PEAK VEL VTI: 12.4 CM
ECHO LV POSTERIOR WALL: 1.38 CM (ref 0.6–1.1)
EJECTION FRACTION: 20 %
FRACTIONAL SHORTENING: 20 % (ref 28–44)
INTERVENTRICULAR SEPTUM: 1.19 CM (ref 0.6–1.1)
LA MAJOR: 8.16 CM
LA MINOR: 7.04 CM
LA WIDTH: 5.28 CM
LEFT ATRIUM SIZE: 5.4 CM
LEFT ATRIUM VOLUME: 183.19 CM3
LEFT INTERNAL DIMENSION IN SYSTOLE: 6 CM (ref 2.1–4)
LEFT VENTRICLE DIASTOLIC VOLUME: 298.64 ML
LEFT VENTRICLE SYSTOLIC VOLUME: 180.24 ML
LEFT VENTRICULAR INTERNAL DIMENSION IN DIASTOLE: 7.5 CM (ref 3.5–6)
LEFT VENTRICULAR MASS: 499.19 G
PISA TR MAX VEL: 2.96 M/S
RA MAJOR: 6.12 CM
RA PRESSURE: 3 MMHG
RA WIDTH: 4.58 CM
RIGHT VENTRICULAR END-DIASTOLIC DIMENSION: 4.5 CM
SINUS: 3.74 CM
STJ: 3.43 CM
TDI LATERAL: 0.07 M/S
TDI SEPTAL: 0.04 M/S
TDI: 0.06 M/S
TR MAX PG: 35 MMHG
TV REST PULMONARY ARTERY PRESSURE: 38 MMHG

## 2023-03-06 PROCEDURE — 3008F BODY MASS INDEX DOCD: CPT | Mod: CPTII,,, | Performed by: INTERNAL MEDICINE

## 2023-03-06 PROCEDURE — 93306 TTE W/DOPPLER COMPLETE: CPT

## 2023-03-06 PROCEDURE — 99213 OFFICE O/P EST LOW 20 MIN: CPT | Mod: PBBFAC,25 | Performed by: INTERNAL MEDICINE

## 2023-03-06 PROCEDURE — 99999 PR PBB SHADOW E&M-EST. PATIENT-LVL III: CPT | Mod: PBBFAC,,, | Performed by: INTERNAL MEDICINE

## 2023-03-06 PROCEDURE — 3077F PR MOST RECENT SYSTOLIC BLOOD PRESSURE >= 140 MM HG: ICD-10-PCS | Mod: CPTII,,, | Performed by: INTERNAL MEDICINE

## 2023-03-06 PROCEDURE — 3079F DIAST BP 80-89 MM HG: CPT | Mod: CPTII,,, | Performed by: INTERNAL MEDICINE

## 2023-03-06 PROCEDURE — 1159F MED LIST DOCD IN RCRD: CPT | Mod: CPTII,,, | Performed by: INTERNAL MEDICINE

## 2023-03-06 PROCEDURE — 3079F PR MOST RECENT DIASTOLIC BLOOD PRESSURE 80-89 MM HG: ICD-10-PCS | Mod: CPTII,,, | Performed by: INTERNAL MEDICINE

## 2023-03-06 PROCEDURE — 4010F ACE/ARB THERAPY RXD/TAKEN: CPT | Mod: CPTII,,, | Performed by: INTERNAL MEDICINE

## 2023-03-06 PROCEDURE — 4010F PR ACE/ARB THEARPY RXD/TAKEN: ICD-10-PCS | Mod: CPTII,,, | Performed by: INTERNAL MEDICINE

## 2023-03-06 PROCEDURE — 1160F RVW MEDS BY RX/DR IN RCRD: CPT | Mod: CPTII,,, | Performed by: INTERNAL MEDICINE

## 2023-03-06 PROCEDURE — 99214 PR OFFICE/OUTPT VISIT, EST, LEVL IV, 30-39 MIN: ICD-10-PCS | Mod: S$PBB,,, | Performed by: INTERNAL MEDICINE

## 2023-03-06 PROCEDURE — 3077F SYST BP >= 140 MM HG: CPT | Mod: CPTII,,, | Performed by: INTERNAL MEDICINE

## 2023-03-06 PROCEDURE — 99214 OFFICE O/P EST MOD 30 MIN: CPT | Mod: S$PBB,,, | Performed by: INTERNAL MEDICINE

## 2023-03-06 PROCEDURE — 93306 TTE W/DOPPLER COMPLETE: CPT | Mod: 26,,, | Performed by: INTERNAL MEDICINE

## 2023-03-06 PROCEDURE — 1159F PR MEDICATION LIST DOCUMENTED IN MEDICAL RECORD: ICD-10-PCS | Mod: CPTII,,, | Performed by: INTERNAL MEDICINE

## 2023-03-06 PROCEDURE — 1160F PR REVIEW ALL MEDS BY PRESCRIBER/CLIN PHARMACIST DOCUMENTED: ICD-10-PCS | Mod: CPTII,,, | Performed by: INTERNAL MEDICINE

## 2023-03-06 PROCEDURE — 93306 ECHO (CUPID ONLY): ICD-10-PCS | Mod: 26,,, | Performed by: INTERNAL MEDICINE

## 2023-03-06 PROCEDURE — 3008F PR BODY MASS INDEX (BMI) DOCUMENTED: ICD-10-PCS | Mod: CPTII,,, | Performed by: INTERNAL MEDICINE

## 2023-03-06 PROCEDURE — 99999 PR PBB SHADOW E&M-EST. PATIENT-LVL III: ICD-10-PCS | Mod: PBBFAC,,, | Performed by: INTERNAL MEDICINE

## 2023-03-06 RX ORDER — METOPROLOL SUCCINATE 25 MG/1
25 TABLET, EXTENDED RELEASE ORAL 2 TIMES DAILY
Qty: 60 TABLET | Refills: 11 | Status: SHIPPED | OUTPATIENT
Start: 2023-03-06 | End: 2023-08-15

## 2023-03-06 RX ORDER — VALSARTAN 40 MG/1
80 TABLET ORAL 2 TIMES DAILY
Qty: 90 TABLET | Refills: 3
Start: 2023-03-06 | End: 2023-04-04

## 2023-03-06 NOTE — PATIENT INSTRUCTIONS
Will increase your metoprolol to 25 mg twice a day and your valsartan to 80 mg twice a day. We will check your kidney function and electrolytes next week with local labs.    We will get an ECG today and refer you to our electrical specialists for consideration of a defibrillator.

## 2023-03-07 ENCOUNTER — TELEPHONE (OUTPATIENT)
Dept: ELECTROPHYSIOLOGY | Facility: CLINIC | Age: 48
End: 2023-03-07
Payer: MEDICAID

## 2023-03-07 NOTE — TELEPHONE ENCOUNTER
Called pt (at all 3 numbers) in regards to message below. No answer, left a message with this information and call back #.

## 2023-03-07 NOTE — TELEPHONE ENCOUNTER
----- Message from Anitra Bae RN sent at 3/6/2023  4:44 PM CST -----  Regarding: FW: Appointment  This is a new patient. Referral is in. Please schedule with Dr. Woodruff.  ----- Message -----  From: Julia Chandra  Sent: 3/6/2023   3:43 PM CST  To: [facility] Arrhythmia Clinical Support Staff  Subject: Appointment                                      Hi     Please call patient with appointment.  Dr. IBARRA has placed referral      Thanks  Julia

## 2023-03-16 ENCOUNTER — TELEPHONE (OUTPATIENT)
Dept: ELECTROPHYSIOLOGY | Facility: CLINIC | Age: 48
End: 2023-03-16
Payer: MEDICAID

## 2023-04-03 ENCOUNTER — TELEPHONE (OUTPATIENT)
Dept: TRANSPLANT | Facility: CLINIC | Age: 48
End: 2023-04-03

## 2023-04-03 NOTE — PROGRESS NOTES
Subjective:   Patient ID:  Wai Lopez is a 47 y.o. male who presents for evaluation of Atrial Fibrillation and Congestive Heart Failure    Referring Heart Failure Specialist: Monique Daniel, DO SANTOS  I had the pleasure of seeing Mr. Lopez today in our electrophysiology clinic in consultation for his cardiomyopathy and risk of sudden death. As you are aware he is a pleasant 47 year-old man with chronic ischemic CMP (LVEF 20%, NYHA class II) s/p MI and CABG on 7/14/2021 complicated by tamponade requiring a pericardial window 2 weeks later, AVNRT s/p slow pathway modification in 2017 by Dr. Celestin, post CABG persistent AF, major GI bleed on aspirin/eliquis in 2022 requiring 4u PRBCs (endoscopy revealed no source) and CKD stage 3a. He has been on GDMT since 10/17/2022. LVEF has not recovered. He was on amiodarone previously. PET stress test is pending (4/19/2023). He reports he has been in AF for 1.5 years. He reports he has never had attempt at rhythm control with his atrial fibrillation.    3/2023 ECHO: LVEF 20%, LVEDD 7.5cm (ECHO 7/2022 from outside hospital: LVEF 15-20%, ECHO in 2/2020 LVEF 35-40%)    My interpretation of today's in clinic ECG is atrial fibrillation with a left bundle type IVCD.    Review of Systems   Constitutional: Negative for fever and malaise/fatigue.   HENT:  Negative for congestion and sore throat.    Eyes:  Negative for blurred vision and visual disturbance.   Cardiovascular:  Positive for dyspnea on exertion. Negative for chest pain, irregular heartbeat, near-syncope, palpitations and syncope.   Respiratory:  Negative for cough and shortness of breath.    Hematologic/Lymphatic: Negative for bleeding problem. Does not bruise/bleed easily.   Skin: Negative.    Musculoskeletal: Negative.    Gastrointestinal:  Negative for bloating, abdominal pain, hematochezia and melena.   Neurological:  Negative for focal weakness and weakness.   Psychiatric/Behavioral: Negative.        Objective:   Physical Exam  Vitals reviewed.   Constitutional:       General: He is not in acute distress.     Appearance: He is well-developed. He is not diaphoretic.   HENT:      Head: Normocephalic and atraumatic.   Eyes:      General:         Right eye: No discharge.         Left eye: No discharge.      Conjunctiva/sclera: Conjunctivae normal.   Cardiovascular:      Rate and Rhythm: Normal rate. Rhythm irregularly irregular.      Heart sounds: No murmur heard.    No friction rub. No gallop.   Pulmonary:      Effort: Pulmonary effort is normal. No respiratory distress.      Breath sounds: Normal breath sounds. No wheezing or rales.   Abdominal:      General: Bowel sounds are normal. There is no distension.      Palpations: Abdomen is soft.      Tenderness: There is no abdominal tenderness.   Musculoskeletal:      Cervical back: Neck supple.   Skin:     General: Skin is warm and dry.   Neurological:      Mental Status: He is alert and oriented to person, place, and time.   Psychiatric:         Behavior: Behavior normal.         Thought Content: Thought content normal.         Judgment: Judgment normal.       Assessment:      1. Ischemic cardiomyopathy    2. Hx of CABG    3. Coronary artery disease of native artery of native heart with stable angina pectoris    4. Chronic combined systolic and diastolic heart failure    5. Stage 3a chronic kidney disease    6. Persistent atrial fibrillation        Plan:     In summary, Mr. Lopez is a pleasant 47 year-old man with chronic ischemic CMP (LVEF 20%, NYHA class II) s/p MI and CABG on 7/14/2021 complicated by tamponade requiring a pericardial window 2 weeks later, AVNRT s/p slow pathway modification in 2017 by Dr. Celestin, post CABG long-standing persistent AF, major GI bleed on aspirin/eliquis in 2022 requiring 4u PRBCs and CKD stage 3a. Looking at his case I notice he has been left in AF without any attempt for rhythm control. The nearest ECHO to before he went  into AF that I have available was from 2020 and his LV function was better. Discussed pathophysiology of AF and possibility that persistent AF could have negatively impacted his LV function. If so it is possible he could have some LV functional recovery with sinus rhythm. However we discussed this would be challenging due to being in persistent AF for over 1 year. Discussed stroke risk with AF ([id number] score 2) and need for anticoagulation. Discussed bleeding risks. He is willing to try anticoagulation again. Discussed however that if his PET scan shows extensive LV infarction then his LV function is less likely to recover with sinus rhythm. Offered MYLES/DCCV and attempt at rhythm control. If able to keep in sinus rhythm would check an ECHO after a few months and proceed from there. Alternatively offered ICD if PET stress test does not show significant ischemia. He would like to attempt to treat his AF first.    Plan  Start eliquis 5mg bid  MYLES/DCCV 1 month later then start amiodarone  If able to keep in sinus rhythm would repeat TTE after a few months of sinus rhythm  If EF does not recover with sinus rhythm or we are unable to keep in sinus rhythm even with amiodarone then would proceed with ICD implantation for primary prevention    Thank you for allowing me to participate in the care of this patient. Please do not hesitate to call me with any questions or concerns.    Rob Galan MD, PhD  Cardiac Electrophysiology

## 2023-04-03 NOTE — TELEPHONE ENCOUNTER
3:15 pm:  F/U on todays nurse lab phone reminder  Seems these labs are for a 1 month f/u w/ Dr. [place] scheduled for 0830 am tomorrow  Received cbc and bmp results via fax from outside lab w/ todays collection date  Na/K:  134/3.8    Cl/Co2:  97/28    Bun/Cr:  3/2.76  WBC:  9.5  H&H:  14.2/43.3   Platelets 209    3/13 outside  Cr; 1.99   Noted 3/6 valsartan inc to 80 bid      Just called all three numbers listed for pt to review these lab results and obtain an assessment  Number listed as mobile: NA LVM asking for a call back w/ my name, office with day, date, time and calling to discuss lab results  Left office call back number and that would like to speak w/ him today-also that I was aware he has an appt w/ Dr. Daniel tomorrow morning but needed to speak w/ him today    Will route this to Dr. Daniel for her awareness of cr results    Will also forward fax email results to medical assistants so they can provide this to Dr. Daniel early tomorrow for appt
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
## 2023-04-04 ENCOUNTER — OFFICE VISIT (OUTPATIENT)
Dept: ELECTROPHYSIOLOGY | Facility: CLINIC | Age: 48
End: 2023-04-04
Payer: MEDICAID

## 2023-04-04 ENCOUNTER — HOSPITAL ENCOUNTER (OUTPATIENT)
Dept: CARDIOLOGY | Facility: CLINIC | Age: 48
Discharge: HOME | End: 2023-04-04
Payer: MEDICAID

## 2023-04-04 ENCOUNTER — OFFICE VISIT (OUTPATIENT)
Dept: TRANSPLANT | Facility: CLINIC | Age: 48
End: 2023-04-04
Payer: MEDICAID

## 2023-04-04 ENCOUNTER — TELEPHONE (OUTPATIENT)
Dept: TRANSPLANT | Facility: CLINIC | Age: 48
End: 2023-04-04

## 2023-04-04 VITALS
SYSTOLIC BLOOD PRESSURE: 122 MMHG | DIASTOLIC BLOOD PRESSURE: 80 MMHG | WEIGHT: 218.06 LBS | BODY MASS INDEX: 30.53 KG/M2 | HEART RATE: 62 BPM | HEIGHT: 71 IN

## 2023-04-04 VITALS
HEIGHT: 71 IN | DIASTOLIC BLOOD PRESSURE: 72 MMHG | BODY MASS INDEX: 30.53 KG/M2 | SYSTOLIC BLOOD PRESSURE: 129 MMHG | HEART RATE: 66 BPM | WEIGHT: 218.06 LBS

## 2023-04-04 DIAGNOSIS — N18.31 STAGE 3A CHRONIC KIDNEY DISEASE: ICD-10-CM

## 2023-04-04 DIAGNOSIS — Z95.1 HX OF CABG: ICD-10-CM

## 2023-04-04 DIAGNOSIS — I50.42 CHRONIC COMBINED SYSTOLIC AND DIASTOLIC HEART FAILURE: ICD-10-CM

## 2023-04-04 DIAGNOSIS — Z86.39 HISTORY OF DIABETES MELLITUS: ICD-10-CM

## 2023-04-04 DIAGNOSIS — I25.5 ISCHEMIC CARDIOMYOPATHY: Primary | ICD-10-CM

## 2023-04-04 DIAGNOSIS — F17.201 TOBACCO ABUSE, IN REMISSION: ICD-10-CM

## 2023-04-04 DIAGNOSIS — I25.118 CORONARY ARTERY DISEASE OF NATIVE ARTERY OF NATIVE HEART WITH STABLE ANGINA PECTORIS: ICD-10-CM

## 2023-04-04 DIAGNOSIS — D63.8 ANEMIA IN OTHER CHRONIC DISEASES CLASSIFIED ELSEWHERE: ICD-10-CM

## 2023-04-04 DIAGNOSIS — F19.21 POLYSUBSTANCE DEPENDENCE, NON-OPIOID, IN REMISSION: ICD-10-CM

## 2023-04-04 DIAGNOSIS — Z98.890 STATUS POST CREATION OF PERICARDIAL WINDOW: ICD-10-CM

## 2023-04-04 DIAGNOSIS — I50.42 CHRONIC COMBINED SYSTOLIC AND DIASTOLIC HEART FAILURE: Primary | ICD-10-CM

## 2023-04-04 DIAGNOSIS — I48.19 PERSISTENT ATRIAL FIBRILLATION: ICD-10-CM

## 2023-04-04 DIAGNOSIS — I25.5 ISCHEMIC CARDIOMYOPATHY: ICD-10-CM

## 2023-04-04 DIAGNOSIS — N17.9 AKI (ACUTE KIDNEY INJURY): ICD-10-CM

## 2023-04-04 DIAGNOSIS — I48.91 ATRIAL FIBRILLATION, UNSPECIFIED TYPE: ICD-10-CM

## 2023-04-04 PROCEDURE — 99214 OFFICE O/P EST MOD 30 MIN: CPT | Mod: S$PBB,,, | Performed by: INTERNAL MEDICINE

## 2023-04-04 PROCEDURE — 99205 PR OFFICE/OUTPT VISIT, NEW, LEVL V, 60-74 MIN: ICD-10-PCS | Mod: S$PBB,,, | Performed by: INTERNAL MEDICINE

## 2023-04-04 PROCEDURE — 99213 OFFICE O/P EST LOW 20 MIN: CPT | Mod: PBBFAC,25,27 | Performed by: INTERNAL MEDICINE

## 2023-04-04 PROCEDURE — 3008F BODY MASS INDEX DOCD: CPT | Mod: CPTII,,, | Performed by: INTERNAL MEDICINE

## 2023-04-04 PROCEDURE — 99999 PR PBB SHADOW E&M-EST. PATIENT-LVL III: CPT | Mod: PBBFAC,,, | Performed by: INTERNAL MEDICINE

## 2023-04-04 PROCEDURE — 3079F PR MOST RECENT DIASTOLIC BLOOD PRESSURE 80-89 MM HG: ICD-10-PCS | Mod: CPTII,,, | Performed by: INTERNAL MEDICINE

## 2023-04-04 PROCEDURE — 93005 ELECTROCARDIOGRAM TRACING: CPT | Mod: PBBFAC | Performed by: INTERNAL MEDICINE

## 2023-04-04 PROCEDURE — 4010F PR ACE/ARB THEARPY RXD/TAKEN: ICD-10-PCS | Mod: CPTII,,, | Performed by: INTERNAL MEDICINE

## 2023-04-04 PROCEDURE — 1160F PR REVIEW ALL MEDS BY PRESCRIBER/CLIN PHARMACIST DOCUMENTED: ICD-10-PCS | Mod: CPTII,,, | Performed by: INTERNAL MEDICINE

## 2023-04-04 PROCEDURE — 99999 PR PBB SHADOW E&M-EST. PATIENT-LVL IV: CPT | Mod: PBBFAC,,, | Performed by: INTERNAL MEDICINE

## 2023-04-04 PROCEDURE — 99214 PR OFFICE/OUTPT VISIT, EST, LEVL IV, 30-39 MIN: ICD-10-PCS | Mod: S$PBB,,, | Performed by: INTERNAL MEDICINE

## 2023-04-04 PROCEDURE — 3008F PR BODY MASS INDEX (BMI) DOCUMENTED: ICD-10-PCS | Mod: CPTII,,, | Performed by: INTERNAL MEDICINE

## 2023-04-04 PROCEDURE — 99999 PR PBB SHADOW E&M-EST. PATIENT-LVL III: ICD-10-PCS | Mod: PBBFAC,,, | Performed by: INTERNAL MEDICINE

## 2023-04-04 PROCEDURE — 3079F DIAST BP 80-89 MM HG: CPT | Mod: CPTII,,, | Performed by: INTERNAL MEDICINE

## 2023-04-04 PROCEDURE — 99999 PR PBB SHADOW E&M-EST. PATIENT-LVL IV: ICD-10-PCS | Mod: PBBFAC,,, | Performed by: INTERNAL MEDICINE

## 2023-04-04 PROCEDURE — 1159F PR MEDICATION LIST DOCUMENTED IN MEDICAL RECORD: ICD-10-PCS | Mod: CPTII,,, | Performed by: INTERNAL MEDICINE

## 2023-04-04 PROCEDURE — 3074F SYST BP LT 130 MM HG: CPT | Mod: CPTII,,, | Performed by: INTERNAL MEDICINE

## 2023-04-04 PROCEDURE — 4010F ACE/ARB THERAPY RXD/TAKEN: CPT | Mod: CPTII,,, | Performed by: INTERNAL MEDICINE

## 2023-04-04 PROCEDURE — 1159F MED LIST DOCD IN RCRD: CPT | Mod: CPTII,,, | Performed by: INTERNAL MEDICINE

## 2023-04-04 PROCEDURE — 93010 EKG 12-LEAD: ICD-10-PCS | Mod: S$PBB,,, | Performed by: INTERNAL MEDICINE

## 2023-04-04 PROCEDURE — 3078F DIAST BP <80 MM HG: CPT | Mod: CPTII,,, | Performed by: INTERNAL MEDICINE

## 2023-04-04 PROCEDURE — 3078F PR MOST RECENT DIASTOLIC BLOOD PRESSURE < 80 MM HG: ICD-10-PCS | Mod: CPTII,,, | Performed by: INTERNAL MEDICINE

## 2023-04-04 PROCEDURE — 99205 OFFICE O/P NEW HI 60 MIN: CPT | Mod: S$PBB,,, | Performed by: INTERNAL MEDICINE

## 2023-04-04 PROCEDURE — 93010 ELECTROCARDIOGRAM REPORT: CPT | Mod: S$PBB,,, | Performed by: INTERNAL MEDICINE

## 2023-04-04 PROCEDURE — 99214 OFFICE O/P EST MOD 30 MIN: CPT | Mod: PBBFAC,27 | Performed by: INTERNAL MEDICINE

## 2023-04-04 PROCEDURE — 1160F RVW MEDS BY RX/DR IN RCRD: CPT | Mod: CPTII,,, | Performed by: INTERNAL MEDICINE

## 2023-04-04 PROCEDURE — 3074F PR MOST RECENT SYSTOLIC BLOOD PRESSURE < 130 MM HG: ICD-10-PCS | Mod: CPTII,,, | Performed by: INTERNAL MEDICINE

## 2023-04-04 RX ORDER — SACUBITRIL AND VALSARTAN 97; 103 MG/1; MG/1
1 TABLET, FILM COATED ORAL 2 TIMES DAILY
Qty: 30 TABLET | Refills: 3 | Status: SHIPPED | OUTPATIENT
Start: 2023-04-04 | End: 2023-07-07 | Stop reason: SDUPTHER

## 2023-04-04 NOTE — PATIENT INSTRUCTIONS
We will replace valsartan with Entresto at  in place of increasing dose of valsartan and plan on repeating labs next week. You can pick this up downstairs. You may start your first dose tonight in place of valsartan.    Due to worsening of your kidney function, let us hold your spironolactone and repeat labs Thursday. Because we are starting Entresto, let us hold your Lasix based on how you look today.    We will await results of you PET stress. If negative will obtain a cardiopulmonary stress test to measure how sick your heart is and discuss with EP ICD placement.

## 2023-04-04 NOTE — TELEPHONE ENCOUNTER
1030 am:  VORB: Dr. Pimentel/Sid, RN:  Repeat CMP and BNP this Thursday, 4/6  Cr from outside lab on 4/3:  2.7   up from 1.9  See her clinic note: Meds on hold and new meds started     Just called An[facility] YOUSSEF, spoke memo/ Jenni JEFFRIES RN:   gave stat lab orders for Thursday 4/6 for CMP and BNP and explained re: 4/3 Cr 2.7 from 1.9, meds on hold and meds changed  Asked labs be drawn in the am and results faxed to [phone] and if results not back before the end of the day to call the on call doctor for the [facility] [phone] for any abnormal values  Told her yesterday we received lab results same day as collection and hoping we will be able to do the same Thursday-she said we should     Also provided her w/ my name, Dr. Daniel's and this office call back 8-5  She repeated back correctly  I have added to nurse lab reminder myself    1040 am:  Called and spoke w/ pt and informed of this lab draw and that I just spoke memo/ Jenni hampton/   company and asked they come our early Thursday 4/6 for his lab work

## 2023-04-06 ENCOUNTER — TELEPHONE (OUTPATIENT)
Dept: TRANSPLANT | Facility: CLINIC | Age: 48
End: 2023-04-06
Payer: MEDICAID

## 2023-04-06 NOTE — TELEPHONE ENCOUNTER
4/6/23: 600PM: Mr. Vidal had labs done today as ordered by Dr. Yoon. Cr. Dropped to 2/36 from 2.76 and NTproBNP dropped to 6008 from 47806. Requested weights for today but have been able to reach patient all day. NO changes in medications at this time but requested repeat lab next week on Tuesday 4/11/23. Will try to contact patient again 4/10/23. Labs scheduled

## 2023-04-10 ENCOUNTER — TELEPHONE (OUTPATIENT)
Dept: TRANSPLANT | Facility: CLINIC | Age: 48
End: 2023-04-10
Payer: MEDICAID

## 2023-04-10 NOTE — TELEPHONE ENCOUNTER
3:30 pm:  See my previous NN as well as NN by Jayson RN from 4/5  I am following up regarding that note, labs and request for assessment by Dr. Witt    I called all 3 numbers listed on pts chart  NA at the 605...(M) number  I left a detailed message ,that it is important I speak w/ Mr. Lopez today to review lab results from last week and get an assessment of how he is doing   Left my name, office w/ day, date time and call back number.

## 2023-04-17 ENCOUNTER — TELEPHONE (OUTPATIENT)
Dept: CARDIOLOGY | Facility: HOSPITAL | Age: 48
End: 2023-04-17
Payer: MEDICAID

## 2023-04-19 ENCOUNTER — HOSPITAL ENCOUNTER (OUTPATIENT)
Dept: CARDIOLOGY | Facility: HOSPITAL | Age: 48
Discharge: HOME | End: 2023-04-19
Attending: INTERNAL MEDICINE
Payer: MEDICAID

## 2023-04-19 DIAGNOSIS — I25.5 ISCHEMIC CARDIOMYOPATHY: ICD-10-CM

## 2023-04-19 DIAGNOSIS — I50.42 CHRONIC COMBINED SYSTOLIC AND DIASTOLIC HEART FAILURE: ICD-10-CM

## 2023-04-27 ENCOUNTER — TELEPHONE (OUTPATIENT)
Dept: ELECTROPHYSIOLOGY | Facility: CLINIC | Age: 48
End: 2023-04-27
Payer: MEDICAID

## 2023-04-27 NOTE — TELEPHONE ENCOUNTER
.Attempted to contact patient to discuss procedure scheduling for MYLES/DCCV with Dr Galan, but no answer received.. Voicemail left requesting return call.

## 2023-05-03 ENCOUNTER — TELEPHONE (OUTPATIENT)
Dept: ELECTROPHYSIOLOGY | Facility: CLINIC | Age: 48
End: 2023-05-03
Payer: MEDICAID

## 2023-05-03 NOTE — TELEPHONE ENCOUNTER
Attempted to contact patient to schedule MYLES/DCCV however mobile and home numbers are not service /disconnected.

## 2023-05-11 ENCOUNTER — TELEPHONE (OUTPATIENT)
Dept: ELECTROPHYSIOLOGY | Facility: CLINIC | Age: 48
End: 2023-05-11
Payer: MEDICAID

## 2023-05-11 NOTE — TELEPHONE ENCOUNTER
Attempted to contact patient to discuss scheduling MYLES/ DCCV with Dr Galan, however no answer to mobile number. Voicemail left requesting return call. Spoke with Barb Burkett, patient's EC, and advised that we have been trying to contact the patient to discuss procedure scheduling and requested that she ask the patient to contact our office. Understanding verbalized.

## 2023-06-01 ENCOUNTER — HOSPITAL ENCOUNTER (OUTPATIENT)
Dept: CARDIOLOGY | Facility: HOSPITAL | Age: 48
Discharge: HOME | End: 2023-06-01
Attending: INTERNAL MEDICINE
Payer: MEDICAID

## 2023-06-01 VITALS
BODY MASS INDEX: 30.52 KG/M2 | DIASTOLIC BLOOD PRESSURE: 85 MMHG | HEART RATE: 64 BPM | HEIGHT: 71 IN | SYSTOLIC BLOOD PRESSURE: 148 MMHG | WEIGHT: 218 LBS

## 2023-06-01 LAB
CFR FLOW - ANTERIOR: 1.58
CFR FLOW - INFERIOR: 1.86
CFR FLOW - LATERAL: 1.69
CFR FLOW - MAX: 2.7
CFR FLOW - MIN: 1.2
CFR FLOW - SEPTAL: 1.67
CFR FLOW - WHOLE HEART: 1.7
CV PHARM DOSE: 0.4 MG
CV STRESS BASE HR: 64 BPM
DIASTOLIC BLOOD PRESSURE: 85 MMHG
EJECTION FRACTION- HIGH: 59 %
END DIASTOLIC INDEX-HIGH: 155 ML/M2
END DIASTOLIC INDEX-LOW: 91 ML/M2
END SYSTOLIC INDEX-HIGH: 78 ML/M2
END SYSTOLIC INDEX-LOW: 40 ML/M2
NUC REST DIASTOLIC VOLUME INDEX: 317
NUC REST EJECTION FRACTION: 23
NUC REST SYSTOLIC VOLUME INDEX: 244
NUC STRESS DIASTOLIC VOLUME INDEX: 355
NUC STRESS EJECTION FRACTION: 25 %
NUC STRESS SYSTOLIC VOLUME INDEX: 265
OHS CV CPX 85 PERCENT MAX PREDICTED HEART RATE MALE: 145
OHS CV CPX MAX PREDICTED HEART RATE: 171
OHS CV CPX PATIENT IS FEMALE: 0
OHS CV CPX PATIENT IS MALE: 1
OHS CV CPX PEAK DIASTOLIC BLOOD PRESSURE: 71 MMHG
OHS CV CPX PEAK HEAR RATE: 61 BPM
OHS CV CPX PEAK RATE PRESSURE PRODUCT: 8601
OHS CV CPX PEAK SYSTOLIC BLOOD PRESSURE: 141 MMHG
OHS CV CPX PERCENT MAX PREDICTED HEART RATE ACHIEVED: 36
OHS CV CPX RATE PRESSURE PRODUCT PRESENTING: 9472
PERFUSION DEFECT 1 SIZE IN %: 7 %
REST FLOW - ANTERIOR: 1.11 CC/MIN/G
REST FLOW - INFERIOR: 0.75 CC/MIN/G
REST FLOW - LATERAL: 1.04 CC/MIN/G
REST FLOW - MAX: 1.43 CC/MIN/G
REST FLOW - MIN: 0.29 CC/MIN/G
REST FLOW - SEPTAL: 0.76 CC/MIN/G
REST FLOW - WHOLE HEART: 0.92 CC/MIN/G
RETIRED EF AND QEF - SEE NOTES: 47 %
STRESS FLOW - ANTERIOR: 0.74 CC/MIN/G
STRESS FLOW - INFERIOR: 1.41 CC/MIN/G
STRESS FLOW - LATERAL: 1.76 CC/MIN/G
STRESS FLOW - MAX: 2.15 CC/MIN/G
STRESS FLOW - MIN: 0.36 CC/MIN/G
STRESS FLOW - SEPTAL: 1.26 CC/MIN/G
STRESS FLOW - WHOLE HEART: 1.29 CC/MIN/G
SYSTOLIC BLOOD PRESSURE: 148 MMHG

## 2023-06-01 PROCEDURE — 93018 CV STRESS TEST I&R ONLY: CPT | Mod: ,,, | Performed by: INTERNAL MEDICINE

## 2023-06-01 PROCEDURE — 93016 CARDIAC PET SCAN STRESS (CUPID ONLY): ICD-10-PCS | Mod: ,,, | Performed by: INTERNAL MEDICINE

## 2023-06-01 PROCEDURE — 78431 MYOCRD IMG PET RST&STRS CT: CPT | Mod: 26,,, | Performed by: INTERNAL MEDICINE

## 2023-06-01 PROCEDURE — 78434 AQMBF PET REST & RX STRESS: CPT | Mod: 26,,, | Performed by: INTERNAL MEDICINE

## 2023-06-01 PROCEDURE — 78431 CARDIAC PET SCAN STRESS (CUPID ONLY): ICD-10-PCS | Mod: 26,,, | Performed by: INTERNAL MEDICINE

## 2023-06-01 PROCEDURE — 78434 AQMBF PET REST & RX STRESS: CPT

## 2023-06-01 PROCEDURE — 78431 MYOCRD IMG PET RST&STRS CT: CPT

## 2023-06-01 PROCEDURE — 78434 CARDIAC PET SCAN STRESS (CUPID ONLY): ICD-10-PCS | Mod: 26,,, | Performed by: INTERNAL MEDICINE

## 2023-06-01 PROCEDURE — 93016 CV STRESS TEST SUPVJ ONLY: CPT | Mod: ,,, | Performed by: INTERNAL MEDICINE

## 2023-06-01 PROCEDURE — 93018 CARDIAC PET SCAN STRESS (CUPID ONLY): ICD-10-PCS | Mod: ,,, | Performed by: INTERNAL MEDICINE

## 2023-06-01 PROCEDURE — 63600175 PHARM REV CODE 636 W HCPCS: Performed by: INTERNAL MEDICINE

## 2023-06-01 RX ORDER — REGADENOSON 0.08 MG/ML
0.4 INJECTION, SOLUTION INTRAVENOUS
Status: COMPLETED | OUTPATIENT
Start: 2023-06-01 | End: 2023-06-01

## 2023-06-01 RX ADMIN — REGADENOSON 0.4 MG: 0.08 INJECTION, SOLUTION INTRAVENOUS at 07:06
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
## 2023-07-07 ENCOUNTER — LAB VISIT (OUTPATIENT)
Dept: LAB | Facility: HOSPITAL | Age: 48
End: 2023-07-07
Attending: INTERNAL MEDICINE
Payer: MEDICAID

## 2023-07-07 ENCOUNTER — OFFICE VISIT (OUTPATIENT)
Dept: TRANSPLANT | Facility: CLINIC | Age: 48
End: 2023-07-07
Payer: MEDICAID

## 2023-07-07 VITALS
WEIGHT: 217.13 LBS | BODY MASS INDEX: 30.4 KG/M2 | DIASTOLIC BLOOD PRESSURE: 85 MMHG | HEIGHT: 71 IN | HEART RATE: 65 BPM | SYSTOLIC BLOOD PRESSURE: 139 MMHG | OXYGEN SATURATION: 98 %

## 2023-07-07 DIAGNOSIS — I50.42 CHRONIC COMBINED SYSTOLIC AND DIASTOLIC HEART FAILURE: ICD-10-CM

## 2023-07-07 DIAGNOSIS — I25.5 ISCHEMIC CARDIOMYOPATHY: ICD-10-CM

## 2023-07-07 DIAGNOSIS — Z86.39 HISTORY OF DIABETES MELLITUS: ICD-10-CM

## 2023-07-07 DIAGNOSIS — Z95.1 HX OF CABG: ICD-10-CM

## 2023-07-07 DIAGNOSIS — D63.8 ANEMIA IN OTHER CHRONIC DISEASES CLASSIFIED ELSEWHERE: ICD-10-CM

## 2023-07-07 DIAGNOSIS — I48.19 PERSISTENT ATRIAL FIBRILLATION: ICD-10-CM

## 2023-07-07 DIAGNOSIS — F19.21 POLYSUBSTANCE DEPENDENCE, NON-OPIOID, IN REMISSION: ICD-10-CM

## 2023-07-07 DIAGNOSIS — I25.118 CORONARY ARTERY DISEASE OF NATIVE ARTERY OF NATIVE HEART WITH STABLE ANGINA PECTORIS: ICD-10-CM

## 2023-07-07 DIAGNOSIS — N18.31 STAGE 3A CHRONIC KIDNEY DISEASE: ICD-10-CM

## 2023-07-07 DIAGNOSIS — Z98.890 STATUS POST CREATION OF PERICARDIAL WINDOW: ICD-10-CM

## 2023-07-07 DIAGNOSIS — F17.201 TOBACCO ABUSE, IN REMISSION: ICD-10-CM

## 2023-07-07 DIAGNOSIS — I50.42 CHRONIC COMBINED SYSTOLIC AND DIASTOLIC HEART FAILURE: Primary | ICD-10-CM

## 2023-07-07 LAB
ALBUMIN SERPL BCP-MCNC: 4.1 G/DL (ref 3.5–5.2)
ALP SERPL-CCNC: 97 U/L (ref 55–135)
ALT SERPL W/O P-5'-P-CCNC: 11 U/L (ref 10–44)
ANION GAP SERPL CALC-SCNC: 9 MMOL/L (ref 8–16)
AST SERPL-CCNC: 21 U/L (ref 10–40)
BILIRUB SERPL-MCNC: 1.5 MG/DL (ref 0.1–1)
BNP SERPL-MCNC: 2027 PG/ML (ref 0–99)
BUN SERPL-MCNC: 32 MG/DL (ref 6–20)
CALCIUM SERPL-MCNC: 9.9 MG/DL (ref 8.7–10.5)
CHLORIDE SERPL-SCNC: 102 MMOL/L (ref 95–110)
CO2 SERPL-SCNC: 25 MMOL/L (ref 23–29)
CREAT SERPL-MCNC: 2.2 MG/DL (ref 0.5–1.4)
EST. GFR  (NO RACE VARIABLE): 35.8 ML/MIN/1.73 M^2
FERRITIN SERPL-MCNC: 55 NG/ML (ref 20–300)
GLUCOSE SERPL-MCNC: 80 MG/DL (ref 70–110)
IRON SERPL-MCNC: 61 UG/DL (ref 45–160)
MAGNESIUM SERPL-MCNC: 2.3 MG/DL (ref 1.6–2.6)
POTASSIUM SERPL-SCNC: 5.3 MMOL/L (ref 3.5–5.1)
PROT SERPL-MCNC: 7.7 G/DL (ref 6–8.4)
SATURATED IRON: 14 % (ref 20–50)
SODIUM SERPL-SCNC: 136 MMOL/L (ref 136–145)
TOTAL IRON BINDING CAPACITY: 428 UG/DL (ref 250–450)
TRANSFERRIN SERPL-MCNC: 289 MG/DL (ref 200–375)

## 2023-07-07 PROCEDURE — 84466 ASSAY OF TRANSFERRIN: CPT | Performed by: INTERNAL MEDICINE

## 2023-07-07 PROCEDURE — 99999 PR PBB SHADOW E&M-EST. PATIENT-LVL V: CPT | Mod: PBBFAC,,, | Performed by: INTERNAL MEDICINE

## 2023-07-07 PROCEDURE — 3075F SYST BP GE 130 - 139MM HG: CPT | Mod: CPTII,,, | Performed by: INTERNAL MEDICINE

## 2023-07-07 PROCEDURE — 99999 PR PBB SHADOW E&M-EST. PATIENT-LVL V: ICD-10-PCS | Mod: PBBFAC,,, | Performed by: INTERNAL MEDICINE

## 2023-07-07 PROCEDURE — 83880 ASSAY OF NATRIURETIC PEPTIDE: CPT | Performed by: INTERNAL MEDICINE

## 2023-07-07 PROCEDURE — 3075F PR MOST RECENT SYSTOLIC BLOOD PRESS GE 130-139MM HG: ICD-10-PCS | Mod: CPTII,,, | Performed by: INTERNAL MEDICINE

## 2023-07-07 PROCEDURE — 1160F PR REVIEW ALL MEDS BY PRESCRIBER/CLIN PHARMACIST DOCUMENTED: ICD-10-PCS | Mod: CPTII,,, | Performed by: INTERNAL MEDICINE

## 2023-07-07 PROCEDURE — 4010F PR ACE/ARB THEARPY RXD/TAKEN: ICD-10-PCS | Mod: CPTII,,, | Performed by: INTERNAL MEDICINE

## 2023-07-07 PROCEDURE — 4010F ACE/ARB THERAPY RXD/TAKEN: CPT | Mod: CPTII,,, | Performed by: INTERNAL MEDICINE

## 2023-07-07 PROCEDURE — 83735 ASSAY OF MAGNESIUM: CPT | Performed by: INTERNAL MEDICINE

## 2023-07-07 PROCEDURE — 36415 COLL VENOUS BLD VENIPUNCTURE: CPT | Performed by: INTERNAL MEDICINE

## 2023-07-07 PROCEDURE — 3079F PR MOST RECENT DIASTOLIC BLOOD PRESSURE 80-89 MM HG: ICD-10-PCS | Mod: CPTII,,, | Performed by: INTERNAL MEDICINE

## 2023-07-07 PROCEDURE — 80053 COMPREHEN METABOLIC PANEL: CPT | Performed by: INTERNAL MEDICINE

## 2023-07-07 PROCEDURE — 3079F DIAST BP 80-89 MM HG: CPT | Mod: CPTII,,, | Performed by: INTERNAL MEDICINE

## 2023-07-07 PROCEDURE — 3008F PR BODY MASS INDEX (BMI) DOCUMENTED: ICD-10-PCS | Mod: CPTII,,, | Performed by: INTERNAL MEDICINE

## 2023-07-07 PROCEDURE — 1159F MED LIST DOCD IN RCRD: CPT | Mod: CPTII,,, | Performed by: INTERNAL MEDICINE

## 2023-07-07 PROCEDURE — 3008F BODY MASS INDEX DOCD: CPT | Mod: CPTII,,, | Performed by: INTERNAL MEDICINE

## 2023-07-07 PROCEDURE — 99215 OFFICE O/P EST HI 40 MIN: CPT | Mod: PBBFAC | Performed by: INTERNAL MEDICINE

## 2023-07-07 PROCEDURE — 82728 ASSAY OF FERRITIN: CPT | Performed by: INTERNAL MEDICINE

## 2023-07-07 PROCEDURE — 99214 PR OFFICE/OUTPT VISIT, EST, LEVL IV, 30-39 MIN: ICD-10-PCS | Mod: S$PBB,,, | Performed by: INTERNAL MEDICINE

## 2023-07-07 PROCEDURE — 1159F PR MEDICATION LIST DOCUMENTED IN MEDICAL RECORD: ICD-10-PCS | Mod: CPTII,,, | Performed by: INTERNAL MEDICINE

## 2023-07-07 PROCEDURE — 1160F RVW MEDS BY RX/DR IN RCRD: CPT | Mod: CPTII,,, | Performed by: INTERNAL MEDICINE

## 2023-07-07 PROCEDURE — 99214 OFFICE O/P EST MOD 30 MIN: CPT | Mod: S$PBB,,, | Performed by: INTERNAL MEDICINE

## 2023-07-07 RX ORDER — FUROSEMIDE 40 MG/1
80 TABLET ORAL DAILY
Qty: 60 TABLET | Refills: 11 | Status: SHIPPED | OUTPATIENT
Start: 2023-07-07 | End: 2023-08-15

## 2023-07-07 RX ORDER — SACUBITRIL AND VALSARTAN 97; 103 MG/1; MG/1
1 TABLET, FILM COATED ORAL 2 TIMES DAILY
Qty: 30 TABLET | Refills: 8 | Status: SHIPPED | OUTPATIENT
Start: 2023-07-07 | End: 2023-07-07 | Stop reason: SDUPTHER

## 2023-07-07 RX ORDER — SACUBITRIL AND VALSARTAN 97; 103 MG/1; MG/1
1 TABLET, FILM COATED ORAL 2 TIMES DAILY
Qty: 120 TABLET | Refills: 8 | Status: SHIPPED | OUTPATIENT
Start: 2023-07-07

## 2023-07-07 NOTE — PATIENT INSTRUCTIONS
Take Lasix 80 mg daily and follow your weights and symptoms. If you note that you start having issues with lightheadedness with standing up consistently, let us know and we will back off on this.     Lets repeat your labs next week to make sure the kidney function is improving with this change.    Keep your phone close by for the electrical doctor team to reach you about the shocking procedure to get you into normal rhythm and defibrillator placement

## 2023-07-14 ENCOUNTER — TELEPHONE (OUTPATIENT)
Dept: TRANSPLANT | Facility: CLINIC | Age: 48
End: 2023-07-14
Payer: MEDICAID

## 2023-07-14 NOTE — TELEPHONE ENCOUNTER
"7/14/2023    Received labs 7/13 follow up reminder; hypervolemic in clinic 7/7 with elevated K: 5.3.    NA/K:  141/4.1    BUN/CR:  27/2.27    Attempted to reach patient via phone on 7/13/2023 12 PM; left voicemail with contact information to clinic for return call following the review of lab values received 7/12.    Attempted to reach patient via phone on 7/14 0830; unable to leave voicemail, voicemail is full.  Attempt contact on second number listed in patient demographic, number is disconnected.    Attempted to reach patient via phone on 7/14 at 1345; left HIPPA compliant voicemail on patient contact alternative number listed as "friend"; patient demographic.  Left my name and clinic contact information only for patient to return call.    "
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
## 2023-08-15 ENCOUNTER — LAB VISIT (OUTPATIENT)
Dept: LAB | Facility: HOSPITAL | Age: 48
End: 2023-08-15
Attending: INTERNAL MEDICINE
Payer: MEDICAID

## 2023-08-15 ENCOUNTER — OFFICE VISIT (OUTPATIENT)
Dept: TRANSPLANT | Facility: CLINIC | Age: 48
End: 2023-08-15
Payer: MEDICAID

## 2023-08-15 VITALS
SYSTOLIC BLOOD PRESSURE: 135 MMHG | HEIGHT: 71 IN | OXYGEN SATURATION: 99 % | DIASTOLIC BLOOD PRESSURE: 84 MMHG | BODY MASS INDEX: 31.54 KG/M2 | HEART RATE: 69 BPM | WEIGHT: 225.31 LBS

## 2023-08-15 DIAGNOSIS — F17.201 TOBACCO ABUSE, IN REMISSION: ICD-10-CM

## 2023-08-15 DIAGNOSIS — I50.42 CHRONIC COMBINED SYSTOLIC AND DIASTOLIC HEART FAILURE: ICD-10-CM

## 2023-08-15 DIAGNOSIS — Z98.890 STATUS POST CREATION OF PERICARDIAL WINDOW: ICD-10-CM

## 2023-08-15 DIAGNOSIS — Z86.39 HISTORY OF DIABETES MELLITUS: ICD-10-CM

## 2023-08-15 DIAGNOSIS — I48.19 PERSISTENT ATRIAL FIBRILLATION: ICD-10-CM

## 2023-08-15 DIAGNOSIS — I25.118 CORONARY ARTERY DISEASE OF NATIVE ARTERY OF NATIVE HEART WITH STABLE ANGINA PECTORIS: ICD-10-CM

## 2023-08-15 DIAGNOSIS — I25.5 ISCHEMIC CARDIOMYOPATHY: ICD-10-CM

## 2023-08-15 DIAGNOSIS — Z95.1 HX OF CABG: ICD-10-CM

## 2023-08-15 DIAGNOSIS — I50.42 CHRONIC COMBINED SYSTOLIC AND DIASTOLIC HEART FAILURE: Primary | ICD-10-CM

## 2023-08-15 LAB
ALBUMIN SERPL BCP-MCNC: 3.5 G/DL (ref 3.5–5.2)
ALP SERPL-CCNC: 106 U/L (ref 55–135)
ALT SERPL W/O P-5'-P-CCNC: 11 U/L (ref 10–44)
ANION GAP SERPL CALC-SCNC: 11 MMOL/L (ref 8–16)
AST SERPL-CCNC: 17 U/L (ref 10–40)
BASOPHILS # BLD AUTO: 0.08 K/UL (ref 0–0.2)
BASOPHILS NFR BLD: 0.9 % (ref 0–1.9)
BILIRUB SERPL-MCNC: 0.8 MG/DL (ref 0.1–1)
BNP SERPL-MCNC: 882 PG/ML (ref 0–99)
BUN SERPL-MCNC: 21 MG/DL (ref 6–20)
CALCIUM SERPL-MCNC: 9 MG/DL (ref 8.7–10.5)
CHLORIDE SERPL-SCNC: 98 MMOL/L (ref 95–110)
CO2 SERPL-SCNC: 30 MMOL/L (ref 23–29)
CREAT SERPL-MCNC: 1.5 MG/DL (ref 0.5–1.4)
DIFFERENTIAL METHOD: ABNORMAL
EOSINOPHIL # BLD AUTO: 0.3 K/UL (ref 0–0.5)
EOSINOPHIL NFR BLD: 3.1 % (ref 0–8)
ERYTHROCYTE [DISTWIDTH] IN BLOOD BY AUTOMATED COUNT: 17.2 % (ref 11.5–14.5)
EST. GFR  (NO RACE VARIABLE): 56.7 ML/MIN/1.73 M^2
GLUCOSE SERPL-MCNC: 102 MG/DL (ref 70–110)
HCT VFR BLD AUTO: 46.6 % (ref 40–54)
HGB BLD-MCNC: 14.4 G/DL (ref 14–18)
IMM GRANULOCYTES # BLD AUTO: 0.04 K/UL (ref 0–0.04)
IMM GRANULOCYTES NFR BLD AUTO: 0.4 % (ref 0–0.5)
LYMPHOCYTES # BLD AUTO: 1.9 K/UL (ref 1–4.8)
LYMPHOCYTES NFR BLD: 20.6 % (ref 18–48)
MAGNESIUM SERPL-MCNC: 2.2 MG/DL (ref 1.6–2.6)
MCH RBC QN AUTO: 26.8 PG (ref 27–31)
MCHC RBC AUTO-ENTMCNC: 30.9 G/DL (ref 32–36)
MCV RBC AUTO: 87 FL (ref 82–98)
MONOCYTES # BLD AUTO: 0.9 K/UL (ref 0.3–1)
MONOCYTES NFR BLD: 9.6 % (ref 4–15)
NEUTROPHILS # BLD AUTO: 5.9 K/UL (ref 1.8–7.7)
NEUTROPHILS NFR BLD: 65.4 % (ref 38–73)
NRBC BLD-RTO: 0 /100 WBC
PLATELET # BLD AUTO: 197 K/UL (ref 150–450)
PMV BLD AUTO: 11.7 FL (ref 9.2–12.9)
POTASSIUM SERPL-SCNC: 4.4 MMOL/L (ref 3.5–5.1)
PROT SERPL-MCNC: 6.5 G/DL (ref 6–8.4)
RBC # BLD AUTO: 5.37 M/UL (ref 4.6–6.2)
SODIUM SERPL-SCNC: 139 MMOL/L (ref 136–145)
WBC # BLD AUTO: 9.02 K/UL (ref 3.9–12.7)

## 2023-08-15 PROCEDURE — 36415 COLL VENOUS BLD VENIPUNCTURE: CPT | Performed by: INTERNAL MEDICINE

## 2023-08-15 PROCEDURE — 99214 PR OFFICE/OUTPT VISIT, EST, LEVL IV, 30-39 MIN: ICD-10-PCS | Mod: S$PBB,,, | Performed by: INTERNAL MEDICINE

## 2023-08-15 PROCEDURE — 1160F PR REVIEW ALL MEDS BY PRESCRIBER/CLIN PHARMACIST DOCUMENTED: ICD-10-PCS | Mod: CPTII,,, | Performed by: INTERNAL MEDICINE

## 2023-08-15 PROCEDURE — 4010F PR ACE/ARB THEARPY RXD/TAKEN: ICD-10-PCS | Mod: CPTII,,, | Performed by: INTERNAL MEDICINE

## 2023-08-15 PROCEDURE — 1159F MED LIST DOCD IN RCRD: CPT | Mod: CPTII,,, | Performed by: INTERNAL MEDICINE

## 2023-08-15 PROCEDURE — 80053 COMPREHEN METABOLIC PANEL: CPT | Performed by: INTERNAL MEDICINE

## 2023-08-15 PROCEDURE — 3075F PR MOST RECENT SYSTOLIC BLOOD PRESS GE 130-139MM HG: ICD-10-PCS | Mod: CPTII,,, | Performed by: INTERNAL MEDICINE

## 2023-08-15 PROCEDURE — 83880 ASSAY OF NATRIURETIC PEPTIDE: CPT | Performed by: INTERNAL MEDICINE

## 2023-08-15 PROCEDURE — 83735 ASSAY OF MAGNESIUM: CPT | Performed by: INTERNAL MEDICINE

## 2023-08-15 PROCEDURE — 99215 OFFICE O/P EST HI 40 MIN: CPT | Mod: PBBFAC | Performed by: INTERNAL MEDICINE

## 2023-08-15 PROCEDURE — 1159F PR MEDICATION LIST DOCUMENTED IN MEDICAL RECORD: ICD-10-PCS | Mod: CPTII,,, | Performed by: INTERNAL MEDICINE

## 2023-08-15 PROCEDURE — 4010F ACE/ARB THERAPY RXD/TAKEN: CPT | Mod: CPTII,,, | Performed by: INTERNAL MEDICINE

## 2023-08-15 PROCEDURE — 3075F SYST BP GE 130 - 139MM HG: CPT | Mod: CPTII,,, | Performed by: INTERNAL MEDICINE

## 2023-08-15 PROCEDURE — 3079F DIAST BP 80-89 MM HG: CPT | Mod: CPTII,,, | Performed by: INTERNAL MEDICINE

## 2023-08-15 PROCEDURE — 99999 PR PBB SHADOW E&M-EST. PATIENT-LVL V: ICD-10-PCS | Mod: PBBFAC,,, | Performed by: INTERNAL MEDICINE

## 2023-08-15 PROCEDURE — 1160F RVW MEDS BY RX/DR IN RCRD: CPT | Mod: CPTII,,, | Performed by: INTERNAL MEDICINE

## 2023-08-15 PROCEDURE — 99214 OFFICE O/P EST MOD 30 MIN: CPT | Mod: S$PBB,,, | Performed by: INTERNAL MEDICINE

## 2023-08-15 PROCEDURE — 99999 PR PBB SHADOW E&M-EST. PATIENT-LVL V: CPT | Mod: PBBFAC,,, | Performed by: INTERNAL MEDICINE

## 2023-08-15 PROCEDURE — 3008F BODY MASS INDEX DOCD: CPT | Mod: CPTII,,, | Performed by: INTERNAL MEDICINE

## 2023-08-15 PROCEDURE — 3079F PR MOST RECENT DIASTOLIC BLOOD PRESSURE 80-89 MM HG: ICD-10-PCS | Mod: CPTII,,, | Performed by: INTERNAL MEDICINE

## 2023-08-15 PROCEDURE — 3008F PR BODY MASS INDEX (BMI) DOCUMENTED: ICD-10-PCS | Mod: CPTII,,, | Performed by: INTERNAL MEDICINE

## 2023-08-15 PROCEDURE — 85025 COMPLETE CBC W/AUTO DIFF WBC: CPT | Performed by: INTERNAL MEDICINE

## 2023-08-15 RX ORDER — FUROSEMIDE 40 MG/1
80 TABLET ORAL
Qty: 60 TABLET | Refills: 11
Start: 2023-08-15 | End: 2023-09-26

## 2023-08-15 RX ORDER — CARVEDILOL 25 MG/1
25 TABLET ORAL 2 TIMES DAILY WITH MEALS
Qty: 90 TABLET | Refills: 11 | Status: SHIPPED | OUTPATIENT
Start: 2023-08-15 | End: 2025-02-05

## 2023-08-15 RX ORDER — SPIRONOLACTONE 25 MG/1
25 TABLET ORAL DAILY
Qty: 90 TABLET | Refills: 11 | Status: SHIPPED | OUTPATIENT
Start: 2023-08-15 | End: 2026-07-30

## 2023-08-15 NOTE — PATIENT INSTRUCTIONS
Lets restart your spironolactone and repeat labs next week locally.    We will change your metoprolol to Coreg 25 mg twice a day. Give it about a week to get over any fatigue you may be feeling. This should get better after this time. If not, call us.    Follow your daily weights and obtain a BP cuff to follow your BP readings at home    Recommend 2 gram sodium restriction and 1500cc fluid restriction.  Encourage physical activity with graded exercise program.  Requested patient to weigh themselves daily, and to notify us if their weight increases by more than 3 lbs in 1 day or 5 lbs in 1 week.

## 2023-08-18 ENCOUNTER — TELEPHONE (OUTPATIENT)
Dept: ELECTROPHYSIOLOGY | Facility: CLINIC | Age: 48
End: 2023-08-18
Payer: MEDICAID

## 2023-08-18 ENCOUNTER — PATIENT MESSAGE (OUTPATIENT)
Dept: ELECTROPHYSIOLOGY | Facility: CLINIC | Age: 48
End: 2023-08-18
Payer: MEDICAID

## 2023-08-18 NOTE — TELEPHONE ENCOUNTER
Attempted to contact patient however number listed is not a working number. Patient portal message sent if effort to contact patient to discuss scheduling MYLES/DCCV.

## 2023-08-21 ENCOUNTER — TELEPHONE (OUTPATIENT)
Dept: ELECTROPHYSIOLOGY | Facility: CLINIC | Age: 48
End: 2023-08-21
Payer: MEDICAID

## 2023-08-21 NOTE — TELEPHONE ENCOUNTER
----- Message from Rob Galan MD sent at 8/15/2023  2:00 PM CDT -----  Ok to schedule    ----- Message -----  From: Melina Babin RN  Sent: 8/15/2023   1:02 PM CDT  To: Rob Galan MD    Please see message below. Pt last seen in April .   OK to schedule MYLES/DCCV? Or any recommendations prior?  ----- Message -----  From: Liz Steel MA  Sent: 8/15/2023  12:55 PM CDT  To: Melina Babin RN      ----- Message -----  From: Carolina Vargas RN  Sent: 8/15/2023  12:41 PM CDT  To: Monique Daniel DO; [facility] Staff    Good afternoon    Please see message below -copied from pts [facility] clinic visit w/ Dr. Daniel this morning:     EP discussed ICD and plans for MYLES guided DCCV + starting amiodarone to restore SR with plans for repeat echo to assess if any improvement in LV function. This is pending scheduling. Will reach out.             Looking at chart we can see numerous attempts by your staff attempting to schedule pt    Dr. Daniel discussed this w/ pt today and seems he is willing to proceed   Would you please reach out to pt and schedule    Thank you  Sincerely  Madelyn

## 2023-08-21 NOTE — TELEPHONE ENCOUNTER
Second attempt to contact patient to discuss scheduling MYLES/DCCV but no answer received. Voicemail left requesting return call to discuss procedure scheduling.

## 2023-08-23 ENCOUNTER — TELEPHONE (OUTPATIENT)
Dept: TRANSPLANT | Facility: CLINIC | Age: 48
End: 2023-08-23
Payer: MEDICAID

## 2023-08-23 NOTE — TELEPHONE ENCOUNTER
4:15 pm:  F/U on todays nurse lab phone reminder  See Dr. Ignacio 8/15/23 clinic note w/ reason for this local lab:  Restart Spironolactone 25 mg once daily w/ repeat labs in 1 week    Today received outside labs from [facility] from 8/22/23 collection:CMP  Na/K:  139/4.3    Cl/Co2: 104/25    Bun/Cr:   26/1.58    ProBNP:  9380    Called and spoke w/ pt   Informed of outside labs being stable  Asked and pt did resume Spironolactone 25 mg once daily on 8/15/23 and doing well    Informed pt to continue medications as prescribed and let this office know of any needs  pt in agreement.

## 2023-08-24 ENCOUNTER — TELEPHONE (OUTPATIENT)
Dept: ELECTROPHYSIOLOGY | Facility: CLINIC | Age: 48
End: 2023-08-24
Payer: MEDICAID

## 2023-08-24 NOTE — TELEPHONE ENCOUNTER
Attempted to call patient to schedule mindi/dccv. No answer. Voice message left for patient to call Aura Pro or Melina Babin to scheduled.

## 2023-08-25 ENCOUNTER — TELEPHONE (OUTPATIENT)
Dept: ELECTROPHYSIOLOGY | Facility: CLINIC | Age: 48
End: 2023-08-25
Payer: MEDICAID

## 2023-08-25 DIAGNOSIS — I48.19 PERSISTENT ATRIAL FIBRILLATION: Primary | ICD-10-CM

## 2023-08-25 NOTE — TELEPHONE ENCOUNTER
Attempted to call patient to schedule mindi/dccv. No answer. Voice message left for patient to call back to schedule.

## 2023-08-29 ENCOUNTER — TELEPHONE (OUTPATIENT)
Dept: UROLOGY | Facility: CLINIC | Age: 48
End: 2023-08-29
Payer: MEDICAID

## 2023-08-31 ENCOUNTER — PATIENT MESSAGE (OUTPATIENT)
Dept: ELECTROPHYSIOLOGY | Facility: CLINIC | Age: 48
End: 2023-08-31
Payer: MEDICAID

## 2023-08-31 ENCOUNTER — TELEPHONE (OUTPATIENT)
Dept: ELECTROPHYSIOLOGY | Facility: CLINIC | Age: 48
End: 2023-08-31
Payer: MEDICAID

## 2023-08-31 NOTE — TELEPHONE ENCOUNTER
Attempted to call patient to schecule procedure. No answer. Voice message left. Patient messaged via portal with possible dates for mindi/dccv. Awaiting a response.

## 2023-09-05 ENCOUNTER — TELEPHONE (OUTPATIENT)
Dept: ELECTROPHYSIOLOGY | Facility: CLINIC | Age: 48
End: 2023-09-05
Payer: MEDICAID

## 2023-09-05 ENCOUNTER — PATIENT MESSAGE (OUTPATIENT)
Dept: ELECTROPHYSIOLOGY | Facility: CLINIC | Age: 48
End: 2023-09-05
Payer: MEDICAID

## 2023-09-05 NOTE — TELEPHONE ENCOUNTER
Attempted to call patient. No answer. Voice message left for patient to call to schedule cardioversion.

## 2023-09-06 DIAGNOSIS — I50.42 CHRONIC COMBINED SYSTOLIC AND DIASTOLIC HEART FAILURE: ICD-10-CM

## 2023-09-06 DIAGNOSIS — I48.19 PERSISTENT ATRIAL FIBRILLATION: Primary | ICD-10-CM

## 2023-09-06 NOTE — PROGRESS NOTES
Spoke with Patient . Scheduled for MYLES/DCCV procedure on 9/14/2023 with Dr Galan. Procedure details reviewed and advised that pre procedure patient instructions will be sent via patient portal as requested. Advised to call the office for any questions or concerns prior to scheduled procedure. Understanding verbalized.

## 2023-09-07 ENCOUNTER — PATIENT MESSAGE (OUTPATIENT)
Dept: ELECTROPHYSIOLOGY | Facility: CLINIC | Age: 48
End: 2023-09-07
Payer: MEDICAID

## 2023-09-07 ENCOUNTER — TELEPHONE (OUTPATIENT)
Dept: ELECTROPHYSIOLOGY | Facility: CLINIC | Age: 48
End: 2023-09-07
Payer: MEDICAID

## 2023-09-07 NOTE — TELEPHONE ENCOUNTER
Patient Instructions  MYLES (Transesophageal Echocardiogram) with Cardioversion     Personal Assistance  The [facility] pre-services team will be submitting to your medical insurance carrier for authorization. However, sometimes there are financial obligations such as co-pays, out of pocket deductibles, and/or payments required.  It is the patient's responsibility to assure their procedure is financially cleared in advance.  Our expert financial counselors are available to provide patients with assistance for personalized cost estimates.  Please reach out to determine if you have a financial responsibility with regards to your procedures.  Call [phone] to speak with an [facility] financial counselor  Live Chat- accessed through [email or web address] or the [facility] patient portal  [facility] messaging- accessed through the [facility] patient portal            Important Medication Information:    HOLD (do NOT take) Farxiga, Entresto, Lasix, Spironolactone on the day of your procedure.  Your last dose should be taken on September 13, 2023    You may take your other usual morning medications with a sip of water on the day of your procedure.         Day of Procedure:    September 14, 2023    [facility]   [facility] [place] [address]  Please report to Cardiology Waiting Room (Same Day Surgery) on the 3rd floor of the Hospital,     Do not eat or drink anything after 12 midnight on the night before your procedure.  Please follow important medication instructions as listed above.   Please do not wear makeup, especially mascara, when arriving for your procedure.  You will be going home after your procedure.  You will need someone to drive you home from the hospital due to anesthesia  [facility] has moved to an optional mask policy in most areas. If you would like your care team to wear a mask, please don't hesitate to ask.   All visitors must wait in a socially distant manner until a member of the  medical team provides an update at the conclusion of the procedure/surgery.  Your support person should provide the staff with a valid cell phone number so that he or she can receive automated updates.      Post Procedure Testing:    September 21, 2023 at 1130 am  [facility], [address] [place] [address]  EKG  You will have a post procedural EKG one week after your cardioversion to assess your heart rhythm and rate.     If you have a pacemaker, defibrillator or loop recorder that is monitored remotely by the [facility] clinic, you may be eligible to send in a remote transmission on the day of your EKG appointment in lieu of the EKG. You will be informed at discharge if this is an option. If you are instructed to send in a remote transmission, please send a manual transmission from your home monitor one week after your procedure and then call the clinic at [phone], option 4, to confirm that your remote transmission was received.      Directions to Cardiology Waiting Room  If you park in the Parking Garage:  Take elevators to the 2nd floor  Walk up ramp and turn right by Gold Elevators  Take elevator to the 3rd floor  Upon exiting the elevator, turn away from the clinic areas  Walk long ko around to front of hospital to area with windows overlooking [facility]  Check in at Reception Desk  OR  If family is dropping you off:  Have them drop you off at the front of the Hospital  (Near the ER, where all the flags are hung).  Take the E elevators to the 3rd floor.  Check in at the Reception Desk in the waiting room.      Any need to reschedule or cancel procedures, or any questions regarding your procedures should be addressed directly with the Arrhythmia Department Nurses at the following phone number: [phone].

## 2023-09-08 ENCOUNTER — PATIENT MESSAGE (OUTPATIENT)
Dept: ELECTROPHYSIOLOGY | Facility: CLINIC | Age: 48
End: 2023-09-08
Payer: MEDICAID

## 2023-09-12 ENCOUNTER — PATIENT MESSAGE (OUTPATIENT)
Dept: ELECTROPHYSIOLOGY | Facility: CLINIC | Age: 48
End: 2023-09-12
Payer: MEDICAID

## 2023-09-12 ENCOUNTER — TELEPHONE (OUTPATIENT)
Dept: ELECTROPHYSIOLOGY | Facility: CLINIC | Age: 48
End: 2023-09-12
Payer: MEDICAID

## 2023-09-12 NOTE — TELEPHONE ENCOUNTER
Called patient to confirm procedure for Thursday 9/14. Got a message saying phone is not in service. I sent a portal message for patient to call me to discuss the procedure.

## 2023-09-13 ENCOUNTER — TELEPHONE (OUTPATIENT)
Dept: ELECTROPHYSIOLOGY | Facility: CLINIC | Age: 48
End: 2023-09-13
Payer: MEDICAID

## 2023-09-13 NOTE — TELEPHONE ENCOUNTER
Called patient to confirm procedure for tomorrow. Patient did not answer. I left a message with my call back number.

## 2023-09-15 ENCOUNTER — PATIENT MESSAGE (OUTPATIENT)
Dept: ELECTROPHYSIOLOGY | Facility: CLINIC | Age: 48
End: 2023-09-15
Payer: MEDICAID
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
## 2023-09-26 ENCOUNTER — OFFICE VISIT (OUTPATIENT)
Dept: TRANSPLANT | Facility: CLINIC | Age: 48
End: 2023-09-26
Payer: MEDICAID

## 2023-09-26 ENCOUNTER — HOSPITAL ENCOUNTER (OUTPATIENT)
Dept: CARDIOLOGY | Facility: HOSPITAL | Age: 48
Discharge: HOME | End: 2023-09-26
Attending: INTERNAL MEDICINE
Payer: MEDICAID

## 2023-09-26 VITALS
BODY MASS INDEX: 30.15 KG/M2 | HEIGHT: 71 IN | WEIGHT: 215.38 LBS | DIASTOLIC BLOOD PRESSURE: 65 MMHG | OXYGEN SATURATION: 99 % | HEART RATE: 70 BPM | SYSTOLIC BLOOD PRESSURE: 136 MMHG

## 2023-09-26 VITALS
DIASTOLIC BLOOD PRESSURE: 84 MMHG | SYSTOLIC BLOOD PRESSURE: 135 MMHG | WEIGHT: 225 LBS | HEIGHT: 71 IN | BODY MASS INDEX: 31.5 KG/M2 | HEART RATE: 55 BPM

## 2023-09-26 DIAGNOSIS — F19.21 POLYSUBSTANCE DEPENDENCE, NON-OPIOID, IN REMISSION: ICD-10-CM

## 2023-09-26 DIAGNOSIS — I25.5 ISCHEMIC CARDIOMYOPATHY: ICD-10-CM

## 2023-09-26 DIAGNOSIS — D63.8 ANEMIA IN OTHER CHRONIC DISEASES CLASSIFIED ELSEWHERE: ICD-10-CM

## 2023-09-26 DIAGNOSIS — Z95.1 HX OF CABG: ICD-10-CM

## 2023-09-26 DIAGNOSIS — I50.42 CHRONIC COMBINED SYSTOLIC AND DIASTOLIC HEART FAILURE: Primary | ICD-10-CM

## 2023-09-26 DIAGNOSIS — Z86.39 HISTORY OF DIABETES MELLITUS: ICD-10-CM

## 2023-09-26 DIAGNOSIS — I50.42 CHRONIC COMBINED SYSTOLIC AND DIASTOLIC HEART FAILURE: ICD-10-CM

## 2023-09-26 DIAGNOSIS — I25.118 CORONARY ARTERY DISEASE OF NATIVE ARTERY OF NATIVE HEART WITH STABLE ANGINA PECTORIS: ICD-10-CM

## 2023-09-26 DIAGNOSIS — F17.201 TOBACCO ABUSE, IN REMISSION: ICD-10-CM

## 2023-09-26 DIAGNOSIS — Z98.890 STATUS POST CREATION OF PERICARDIAL WINDOW: ICD-10-CM

## 2023-09-26 DIAGNOSIS — I48.19 PERSISTENT ATRIAL FIBRILLATION: ICD-10-CM

## 2023-09-26 DIAGNOSIS — N18.31 STAGE 3A CHRONIC KIDNEY DISEASE: ICD-10-CM

## 2023-09-26 LAB
ASCENDING AORTA: 4.5 CM
AV INDEX (PROSTH): 0.85
AV MEAN GRADIENT: 3 MMHG
AV PEAK GRADIENT: 7 MMHG
AV VALVE AREA BY VELOCITY RATIO: 5.72 CM²
AV VALVE AREA: 5.81 CM²
AV VELOCITY RATIO: 0.83
BSA FOR ECHO PROCEDURE: 2.26 M2
CV ECHO LV RWT: 0.48 CM
DOP CALC AO PEAK VEL: 1.31 M/S
DOP CALC AO VTI: 25.18 CM
DOP CALC LVOT AREA: 6.9 CM2
DOP CALC LVOT DIAMETER: 2.96 CM
DOP CALC LVOT PEAK VEL: 1.09 M/S
DOP CALC LVOT STROKE VOLUME: 146.36 CM3
DOP CALCLVOT PEAK VEL VTI: 21.28 CM
E/A RATIO: 3.9
E/E' RATIO: 8.21 M/S
ECHO LV POSTERIOR WALL: 1.49 CM (ref 0.6–1.1)
FRACTIONAL SHORTENING: 15 % (ref 28–44)
INTERVENTRICULAR SEPTUM: 1 CM (ref 0.6–1.1)
IVRT: 117.98 MSEC
LA MAJOR: 6.92 CM
LA MINOR: 6.44 CM
LA WIDTH: 5.6 CM
LEFT ATRIUM SIZE: 6.04 CM
LEFT ATRIUM VOLUME INDEX MOD: 78.6 ML/M2
LEFT ATRIUM VOLUME INDEX: 86.4 ML/M2
LEFT ATRIUM VOLUME MOD: 174.41 CM3
LEFT ATRIUM VOLUME: 191.8 CM3
LEFT INTERNAL DIMENSION IN SYSTOLE: 5.31 CM (ref 2.1–4)
LEFT VENTRICLE DIASTOLIC VOLUME INDEX: 89.75 ML/M2
LEFT VENTRICLE DIASTOLIC VOLUME: 199.24 ML
LEFT VENTRICLE MASS INDEX: 160 G/M2
LEFT VENTRICLE SYSTOLIC VOLUME INDEX: 61.2 ML/M2
LEFT VENTRICLE SYSTOLIC VOLUME: 135.76 ML
LEFT VENTRICULAR INTERNAL DIMENSION IN DIASTOLE: 6.27 CM (ref 3.5–6)
LEFT VENTRICULAR MASS: 354.81 G
LV LATERAL E/E' RATIO: 5.57 M/S
LV SEPTAL E/E' RATIO: 15.6 M/S
MV A" WAVE DURATION": 41.86 MSEC
MV PEAK A VEL: 0.2 M/S
MV PEAK E VEL: 0.78 M/S
OHS LV EJECTION FRACTION SIMPSONS BIPLANE MOD: 40 %
PISA TR MAX VEL: 2.25 M/S
PULM VEIN S/D RATIO: 0.58
PV PEAK D VEL: 0.31 M/S
PV PEAK S VEL: 0.18 M/S
RA MAJOR: 4.82 CM
RA PRESSURE ESTIMATED: 3 MMHG
RA WIDTH: 4.4 CM
RIGHT VENTRICULAR END-DIASTOLIC DIMENSION: 3.89 CM
RV TB RVSP: 5 MMHG
SINUS: 4.53 CM
STJ: 3.83 CM
TDI LATERAL: 0.14 M/S
TDI SEPTAL: 0.05 M/S
TDI: 0.1 M/S
TR MAX PG: 20 MMHG
TRICUSPID ANNULAR PLANE SYSTOLIC EXCURSION: 1.25 CM
TV REST PULMONARY ARTERY PRESSURE: 23 MMHG
Z-SCORE OF LEFT VENTRICULAR DIMENSION IN END DIASTOLE: -2.12
Z-SCORE OF LEFT VENTRICULAR DIMENSION IN END SYSTOLE: 0.96

## 2023-09-26 PROCEDURE — 1159F MED LIST DOCD IN RCRD: CPT | Mod: CPTII,,, | Performed by: INTERNAL MEDICINE

## 2023-09-26 PROCEDURE — 93306 TTE W/DOPPLER COMPLETE: CPT

## 2023-09-26 PROCEDURE — 1160F RVW MEDS BY RX/DR IN RCRD: CPT | Mod: CPTII,,, | Performed by: INTERNAL MEDICINE

## 2023-09-26 PROCEDURE — 93306 TTE W/DOPPLER COMPLETE: CPT | Mod: 26,,, | Performed by: INTERNAL MEDICINE

## 2023-09-26 PROCEDURE — 99214 OFFICE O/P EST MOD 30 MIN: CPT | Mod: S$PBB,,, | Performed by: INTERNAL MEDICINE

## 2023-09-26 PROCEDURE — 3078F PR MOST RECENT DIASTOLIC BLOOD PRESSURE < 80 MM HG: ICD-10-PCS | Mod: CPTII,,, | Performed by: INTERNAL MEDICINE

## 2023-09-26 PROCEDURE — 3078F DIAST BP <80 MM HG: CPT | Mod: CPTII,,, | Performed by: INTERNAL MEDICINE

## 2023-09-26 PROCEDURE — 99999 PR PBB SHADOW E&M-EST. PATIENT-LVL IV: ICD-10-PCS | Mod: PBBFAC,,, | Performed by: INTERNAL MEDICINE

## 2023-09-26 PROCEDURE — 1159F PR MEDICATION LIST DOCUMENTED IN MEDICAL RECORD: ICD-10-PCS | Mod: CPTII,,, | Performed by: INTERNAL MEDICINE

## 2023-09-26 PROCEDURE — 99214 PR OFFICE/OUTPT VISIT, EST, LEVL IV, 30-39 MIN: ICD-10-PCS | Mod: S$PBB,,, | Performed by: INTERNAL MEDICINE

## 2023-09-26 PROCEDURE — 4010F ACE/ARB THERAPY RXD/TAKEN: CPT | Mod: CPTII,,, | Performed by: INTERNAL MEDICINE

## 2023-09-26 PROCEDURE — 4010F PR ACE/ARB THEARPY RXD/TAKEN: ICD-10-PCS | Mod: CPTII,,, | Performed by: INTERNAL MEDICINE

## 2023-09-26 PROCEDURE — 3008F PR BODY MASS INDEX (BMI) DOCUMENTED: ICD-10-PCS | Mod: CPTII,,, | Performed by: INTERNAL MEDICINE

## 2023-09-26 PROCEDURE — 3008F BODY MASS INDEX DOCD: CPT | Mod: CPTII,,, | Performed by: INTERNAL MEDICINE

## 2023-09-26 PROCEDURE — 3075F SYST BP GE 130 - 139MM HG: CPT | Mod: CPTII,,, | Performed by: INTERNAL MEDICINE

## 2023-09-26 PROCEDURE — 99999 PR PBB SHADOW E&M-EST. PATIENT-LVL IV: CPT | Mod: PBBFAC,,, | Performed by: INTERNAL MEDICINE

## 2023-09-26 PROCEDURE — 3075F PR MOST RECENT SYSTOLIC BLOOD PRESS GE 130-139MM HG: ICD-10-PCS | Mod: CPTII,,, | Performed by: INTERNAL MEDICINE

## 2023-09-26 PROCEDURE — 93306 ECHO (CUPID ONLY): ICD-10-PCS | Mod: 26,,, | Performed by: INTERNAL MEDICINE

## 2023-09-26 PROCEDURE — 1160F PR REVIEW ALL MEDS BY PRESCRIBER/CLIN PHARMACIST DOCUMENTED: ICD-10-PCS | Mod: CPTII,,, | Performed by: INTERNAL MEDICINE

## 2023-09-26 PROCEDURE — 99214 OFFICE O/P EST MOD 30 MIN: CPT | Mod: PBBFAC,25 | Performed by: INTERNAL MEDICINE

## 2023-09-26 RX ORDER — ISOSORBIDE MONONITRATE 30 MG/1
60 TABLET, EXTENDED RELEASE ORAL DAILY
Qty: 90 TABLET | Refills: 3 | Status: SHIPPED | OUTPATIENT
Start: 2023-09-26

## 2023-09-26 RX ORDER — FUROSEMIDE 40 MG/1
40 TABLET ORAL
Qty: 60 TABLET | Refills: 11
Start: 2023-09-26

## 2023-09-26 RX ORDER — DAPAGLIFLOZIN 10 MG/1
10 TABLET, FILM COATED ORAL DAILY
Qty: 30 TABLET | Refills: 11 | Status: SHIPPED | OUTPATIENT
Start: 2023-09-26

## 2023-09-26 RX ORDER — HYDRALAZINE HYDROCHLORIDE 25 MG/1
25 TABLET, FILM COATED ORAL 2 TIMES DAILY
Qty: 60 TABLET | Refills: 11 | Status: SHIPPED | OUTPATIENT
Start: 2023-09-26 | End: 2024-09-25

## 2023-09-26 NOTE — PATIENT INSTRUCTIONS
Reduce your Lasix dose to 40 mg (1 tablet) as needed when you feel like you're carrying extra fluid.     Great job staying active and great news seeing your heart function has improved and the enlarged heart size has decreased.     Since your BP is still high, we like to reduce this to as low as you can tolerate to reduce the stress load on your heart.     We will increase your Imdur to 60 mg daily and start hydralazine 25 mg twice a day. Follow your home BP readings. You may get a headache with the increase in your Imdur. If it doesn't get better, let us know.

## 2023-10-03 ENCOUNTER — TELEPHONE (OUTPATIENT)
Dept: ELECTROPHYSIOLOGY | Facility: CLINIC | Age: 48
End: 2023-10-03
Payer: MEDICAID

## 2023-10-03 ENCOUNTER — PATIENT MESSAGE (OUTPATIENT)
Dept: ELECTROPHYSIOLOGY | Facility: CLINIC | Age: 48
End: 2023-10-03
Payer: MEDICAID

## 2023-10-03 NOTE — TELEPHONE ENCOUNTER
Tried to call patient to confirm procedure and arrival time for 10/5/23. Call would not go through.

## 2023-10-05 ENCOUNTER — HOSPITAL ENCOUNTER (OUTPATIENT)
Facility: HOSPITAL | Age: 48
Discharge: HOME | End: 2023-10-05
Attending: INTERNAL MEDICINE | Admitting: INTERNAL MEDICINE
Payer: MEDICAID

## 2023-10-05 ENCOUNTER — ANESTHESIA EVENT (OUTPATIENT)
Dept: MEDSURG UNIT | Facility: HOSPITAL | Age: 48
End: 2023-10-05
Payer: MEDICAID

## 2023-10-05 ENCOUNTER — HOSPITAL ENCOUNTER (OUTPATIENT)
Dept: CARDIOLOGY | Facility: HOSPITAL | Age: 48
Discharge: HOME | End: 2023-10-05
Attending: INTERNAL MEDICINE | Admitting: INTERNAL MEDICINE
Payer: MEDICAID

## 2023-10-05 ENCOUNTER — ANESTHESIA (OUTPATIENT)
Dept: MEDSURG UNIT | Facility: HOSPITAL | Age: 48
End: 2023-10-05
Payer: MEDICAID

## 2023-10-05 VITALS
HEART RATE: 57 BPM | SYSTOLIC BLOOD PRESSURE: 138 MMHG | BODY MASS INDEX: 30.1 KG/M2 | WEIGHT: 215 LBS | DIASTOLIC BLOOD PRESSURE: 82 MMHG | HEIGHT: 71 IN | RESPIRATION RATE: 16 BRPM

## 2023-10-05 VITALS
HEART RATE: 58 BPM | BODY MASS INDEX: 30.1 KG/M2 | WEIGHT: 215 LBS | DIASTOLIC BLOOD PRESSURE: 64 MMHG | OXYGEN SATURATION: 96 % | RESPIRATION RATE: 17 BRPM | SYSTOLIC BLOOD PRESSURE: 114 MMHG | TEMPERATURE: 98 F | HEIGHT: 71 IN

## 2023-10-05 DIAGNOSIS — I50.42 CHRONIC COMBINED SYSTOLIC AND DIASTOLIC HEART FAILURE: ICD-10-CM

## 2023-10-05 DIAGNOSIS — I48.19 PERSISTENT ATRIAL FIBRILLATION: ICD-10-CM

## 2023-10-05 DIAGNOSIS — I48.91 ATRIAL FIBRILLATION, UNSPECIFIED TYPE: Primary | ICD-10-CM

## 2023-10-05 DIAGNOSIS — I25.5 ISCHEMIC CARDIOMYOPATHY: ICD-10-CM

## 2023-10-05 DIAGNOSIS — I48.91 ATRIAL FIBRILLATION: ICD-10-CM

## 2023-10-05 LAB
APTT PPP: 30.8 SEC (ref 21–32)
INR PPP: 1 (ref 0.8–1.2)
PROTHROMBIN TIME: 10.9 SEC (ref 9–12.5)

## 2023-10-05 PROCEDURE — 93321 DOPPLER ECHO F-UP/LMTD STD: CPT | Mod: 26,,, | Performed by: INTERNAL MEDICINE

## 2023-10-05 PROCEDURE — 63600175 PHARM REV CODE 636 W HCPCS: Performed by: NURSE ANESTHETIST, CERTIFIED REGISTERED

## 2023-10-05 PROCEDURE — 25000003 PHARM REV CODE 250: Performed by: NURSE ANESTHETIST, CERTIFIED REGISTERED

## 2023-10-05 PROCEDURE — 92960 CARDIOVERSION ELECTRIC EXT: CPT | Mod: ,,, | Performed by: STUDENT IN AN ORGANIZED HEALTH CARE EDUCATION/TRAINING PROGRAM

## 2023-10-05 PROCEDURE — D9220A PRA ANESTHESIA: Mod: ANES,,, | Performed by: ANESTHESIOLOGY

## 2023-10-05 PROCEDURE — D9220A PRA ANESTHESIA: ICD-10-PCS | Mod: ANES,,, | Performed by: ANESTHESIOLOGY

## 2023-10-05 PROCEDURE — 93010 EKG 12-LEAD: ICD-10-PCS | Mod: ,,, | Performed by: INTERNAL MEDICINE

## 2023-10-05 PROCEDURE — 85730 THROMBOPLASTIN TIME PARTIAL: CPT | Performed by: INTERNAL MEDICINE

## 2023-10-05 PROCEDURE — 37000009 HC ANESTHESIA EA ADD 15 MINS: Performed by: STUDENT IN AN ORGANIZED HEALTH CARE EDUCATION/TRAINING PROGRAM

## 2023-10-05 PROCEDURE — 92960 CARDIOVERSION ELECTRIC EXT: CPT | Performed by: STUDENT IN AN ORGANIZED HEALTH CARE EDUCATION/TRAINING PROGRAM

## 2023-10-05 PROCEDURE — 93325 DOPPLER ECHO COLOR FLOW MAPG: CPT | Mod: 26,,, | Performed by: INTERNAL MEDICINE

## 2023-10-05 PROCEDURE — 93005 ELECTROCARDIOGRAM TRACING: CPT

## 2023-10-05 PROCEDURE — 93325 DOPPLER ECHO COLOR FLOW MAPG: CPT

## 2023-10-05 PROCEDURE — 37000008 HC ANESTHESIA 1ST 15 MINUTES: Performed by: STUDENT IN AN ORGANIZED HEALTH CARE EDUCATION/TRAINING PROGRAM

## 2023-10-05 PROCEDURE — 93321 TRANSESOPHAGEAL ECHO (TEE) (CUPID ONLY): ICD-10-PCS | Mod: 26,,, | Performed by: INTERNAL MEDICINE

## 2023-10-05 PROCEDURE — 93010 ELECTROCARDIOGRAM REPORT: CPT | Mod: ,,, | Performed by: INTERNAL MEDICINE

## 2023-10-05 PROCEDURE — 85610 PROTHROMBIN TIME: CPT | Performed by: INTERNAL MEDICINE

## 2023-10-05 PROCEDURE — 92960 PR CARDIOVERSION, ELECTIVE;EXTERN: ICD-10-PCS | Mod: ,,, | Performed by: STUDENT IN AN ORGANIZED HEALTH CARE EDUCATION/TRAINING PROGRAM

## 2023-10-05 PROCEDURE — D9220A PRA ANESTHESIA: ICD-10-PCS | Mod: CRNA,,, | Performed by: NURSE ANESTHETIST, CERTIFIED REGISTERED

## 2023-10-05 PROCEDURE — D9220A PRA ANESTHESIA: Mod: CRNA,,, | Performed by: NURSE ANESTHETIST, CERTIFIED REGISTERED

## 2023-10-05 PROCEDURE — 93312 TRANSESOPHAGEAL ECHO (TEE) (CUPID ONLY): ICD-10-PCS | Mod: 26,,, | Performed by: INTERNAL MEDICINE

## 2023-10-05 PROCEDURE — 25000003 PHARM REV CODE 250: Performed by: NURSE PRACTITIONER

## 2023-10-05 PROCEDURE — 93312 ECHO TRANSESOPHAGEAL: CPT | Mod: 26,,, | Performed by: INTERNAL MEDICINE

## 2023-10-05 PROCEDURE — 93325 TRANSESOPHAGEAL ECHO (TEE) (CUPID ONLY): ICD-10-PCS | Mod: 26,,, | Performed by: INTERNAL MEDICINE

## 2023-10-05 RX ORDER — ROCURONIUM BROMIDE 10 MG/ML
INJECTION, SOLUTION INTRAVENOUS
Status: DISCONTINUED | OUTPATIENT
Start: 2023-10-05 | End: 2023-10-05

## 2023-10-05 RX ORDER — PHENYLEPHRINE HYDROCHLORIDE 10 MG/ML
INJECTION INTRAVENOUS
Status: DISCONTINUED | OUTPATIENT
Start: 2023-10-05 | End: 2023-10-05

## 2023-10-05 RX ORDER — LIDOCAINE HYDROCHLORIDE 20 MG/ML
INJECTION INTRAVENOUS
Status: DISCONTINUED | OUTPATIENT
Start: 2023-10-05 | End: 2023-10-05

## 2023-10-05 RX ORDER — PROPOFOL 10 MG/ML
VIAL (ML) INTRAVENOUS CONTINUOUS PRN
Status: DISCONTINUED | OUTPATIENT
Start: 2023-10-05 | End: 2023-10-05

## 2023-10-05 RX ORDER — HYDROMORPHONE HYDROCHLORIDE 1 MG/ML
0.2 INJECTION, SOLUTION INTRAMUSCULAR; INTRAVENOUS; SUBCUTANEOUS EVERY 5 MIN PRN
Status: DISCONTINUED | OUTPATIENT
Start: 2023-10-05 | End: 2023-10-05 | Stop reason: HOSPADM

## 2023-10-05 RX ORDER — FENTANYL CITRATE 50 UG/ML
25 INJECTION, SOLUTION INTRAMUSCULAR; INTRAVENOUS EVERY 5 MIN PRN
Status: DISCONTINUED | OUTPATIENT
Start: 2023-10-05 | End: 2023-10-05 | Stop reason: HOSPADM

## 2023-10-05 RX ORDER — DEXMEDETOMIDINE HYDROCHLORIDE 100 UG/ML
INJECTION, SOLUTION INTRAVENOUS
Status: DISCONTINUED | OUTPATIENT
Start: 2023-10-05 | End: 2023-10-05

## 2023-10-05 RX ORDER — AMIODARONE HYDROCHLORIDE 400 MG/1
TABLET ORAL
Qty: 42 TABLET | Refills: 0 | Status: SHIPPED | OUTPATIENT
Start: 2023-10-05 | End: 2023-10-05 | Stop reason: SDUPTHER

## 2023-10-05 RX ORDER — AMIODARONE HYDROCHLORIDE 200 MG/1
TABLET ORAL
Qty: 70 TABLET | Refills: 0 | Status: SHIPPED | OUTPATIENT
Start: 2023-10-05 | End: 2023-10-05 | Stop reason: HOSPADM

## 2023-10-05 RX ORDER — SILVER SULFADIAZINE 10 G/1000G
CREAM TOPICAL 2 TIMES DAILY PRN
Status: DISCONTINUED | OUTPATIENT
Start: 2023-10-05 | End: 2023-10-05 | Stop reason: HOSPADM

## 2023-10-05 RX ORDER — AMIODARONE HYDROCHLORIDE 200 MG/1
TABLET ORAL
Qty: 70 TABLET | Refills: 0 | Status: SHIPPED | OUTPATIENT
Start: 2023-10-05 | End: 2023-11-02

## 2023-10-05 RX ORDER — AMIODARONE HYDROCHLORIDE 200 MG/1
200 TABLET ORAL DAILY
Qty: 30 TABLET | Refills: 4 | Status: SHIPPED | OUTPATIENT
Start: 2023-11-02

## 2023-10-05 RX ORDER — KETAMINE HCL IN 0.9 % NACL 50 MG/5 ML
SYRINGE (ML) INTRAVENOUS
Status: DISCONTINUED | OUTPATIENT
Start: 2023-10-05 | End: 2023-10-05

## 2023-10-05 RX ORDER — DIPHENHYDRAMINE HYDROCHLORIDE 50 MG/ML
25 INJECTION INTRAMUSCULAR; INTRAVENOUS EVERY 6 HOURS PRN
Status: DISCONTINUED | OUTPATIENT
Start: 2023-10-05 | End: 2023-10-05 | Stop reason: HOSPADM

## 2023-10-05 RX ORDER — ONDANSETRON 2 MG/ML
4 INJECTION INTRAMUSCULAR; INTRAVENOUS ONCE AS NEEDED
Status: DISCONTINUED | OUTPATIENT
Start: 2023-10-05 | End: 2023-10-05 | Stop reason: HOSPADM

## 2023-10-05 RX ADMIN — DEXMEDETOMIDINE 8 MCG: 100 INJECTION, SOLUTION, CONCENTRATE INTRAVENOUS at 01:10

## 2023-10-05 RX ADMIN — PROPOFOL 125 MCG/KG/MIN: 10 INJECTION, EMULSION INTRAVENOUS at 01:10

## 2023-10-05 RX ADMIN — GLYCOPYRROLATE 0.1 MG: 0.2 INJECTION, SOLUTION INTRAMUSCULAR; INTRAVENOUS at 01:10

## 2023-10-05 RX ADMIN — APIXABAN 5 MG: 5 TABLET, FILM COATED ORAL at 12:10

## 2023-10-05 RX ADMIN — Medication 20 MG: at 01:10

## 2023-10-05 RX ADMIN — DEXMEDETOMIDINE 8 MCG: 100 INJECTION, SOLUTION, CONCENTRATE INTRAVENOUS at 02:10

## 2023-10-05 RX ADMIN — SUGAMMADEX 200 MG: 100 INJECTION, SOLUTION INTRAVENOUS at 01:10

## 2023-10-05 RX ADMIN — DEXMEDETOMIDINE 4 MCG: 100 INJECTION, SOLUTION, CONCENTRATE INTRAVENOUS at 01:10

## 2023-10-05 RX ADMIN — SODIUM CHLORIDE: 0.9 INJECTION, SOLUTION INTRAVENOUS at 01:10

## 2023-10-05 RX ADMIN — ROCURONIUM BROMIDE 20 MG: 10 INJECTION INTRAVENOUS at 01:10

## 2023-10-05 RX ADMIN — PHENYLEPHRINE HYDROCHLORIDE 100 MCG: 10 INJECTION INTRAVENOUS at 01:10

## 2023-10-05 RX ADMIN — LIDOCAINE HYDROCHLORIDE 40 MG: 20 INJECTION INTRAVENOUS at 01:10

## 2023-10-05 NOTE — ANESTHESIA PREPROCEDURE EVALUATION
10/05/2023  Wai Lopez is a 48 y.o., male.  Patient Active Problem List   Diagnosis    Chronic combined systolic and diastolic heart failure    Coronary artery disease of native artery of native heart with stable angina pectoris    Hx of CABG    Status post creation of pericardial window    AF (atrial fibrillation)    Stage 3a chronic kidney disease    Anemia in other chronic diseases classified elsewhere    History of diabetes mellitus    Polysubstance dependence, non-opioid, in remission    Tobacco abuse, in remission    Ambulatory dysfunction    Ischemic cardiomyopathy           Pre-op Assessment          Review of Systems      Physical Exam    Airway:  No airway management difficulties anticipated  Dental:No active dental issues noted  Chest/Lungs:  Clear to auscultation    Heart:  Rate: Normal  Rhythm: Regular Rhythm  Sounds: Normal        Anesthesia Plan  Type of Anesthesia, risks & benefits discussed:    Anesthesia Type: Gen Natural Airway  Informed Consent: Informed consent signed with the Patient and all parties understand the risks and agree with anesthesia plan.  All questions answered.   ASA Score: 3  Anesthesia Plan Notes: Chart reviewed. Patient seen and examined. Anesthesia plan discussed and questions answered. E-consent signed. Gerald Jimenez MD    Ready For Surgery From Anesthesia Perspective.     .

## 2023-10-05 NOTE — DISCHARGE SUMMARY
[facility] - [facility]  Cardiac Electrophysiology  Discharge Summary      Patient Name: Wai Lopez  MRN: [id number]  Admission Date: 10/5/2023  Hospital Length of Stay: 0 days  Discharge Date and Time:  10/05/2023 5:14 PM  Attending Physician: Rob Galan MD    Discharging Provider: Kamini Garcia NP  Primary Care Physician: Angelina, Primary Doctor    HPI:   Wai Lopez 48 y.o. male presents for MYLES/DCCV.  Patient has a history of chronic ischemic CMP (LVEF 20%, NYHA class II) s/p MI and CABG on 7/14/2021 complicated by tamponade requiring a pericardial window 2 weeks later, AVNRT s/p slow pathway modification in 2017 by Dr. Celestni, post CABG persistent AF, major GI bleed on aspirin/eliquis in 2022 requiring 4u PRBCs (endoscopy revealed no source) and CKD stage 3a. He has been on GDMT since 10/17/2022. LVEF has not recovered. He was on amiodarone previously. He reports he has been in AF for 1.5 years. He reports he has never had attempt at rhythm control with his atrial fibrillation.     3/2023 ECHO: LVEF 20%, LVEDD 7.5cm (ECHO 7/2022 from outside hospital: LVEF 15-20%, ECHO in 2/2020 LVEF 35-40%)    Echo    Result Date: 9/26/2023    Left Ventricle: The left ventricle is mildly dilated. Normal wall   thickness. There is mild concentric hypertrophy. Normal wall motion. There   is moderately reduced systolic function with a visually estimated ejection   fraction of 35 - 40%. Significant beat to beat variability in EF.  Biplane   (2D) method of discs ejection fraction is 40%. Unable to assess due to   atrial fibrillation. Wall motion is difficult to identify in the setting   of AF    The following segments are hypokinetic: basal inferior, basal   inferolateral, basal anterolateral, mid inferolateral, mid anterolateral   and apical lateral.    Right Ventricle: Normal right ventricular cavity size. Wall thickness   is normal. Right ventricle wall motion  is normal. Systolic function is   normal.     Left Atrium: Left atrium is dilated.    Aortic Valve: The aortic valve is a trileaflet valve. There is mild   annular calcification present.    Pulmonary Artery: The estimated pulmonary artery systolic pressure is   23 mmHg.    IVC/SVC: Normal venous pressure at 3 mmHg.        Echo    Result Date: 3/6/2023  · The left ventricle is severely enlarged with eccentric hypertrophy and   severely decreased systolic function. The estimated ejection fraction is   20%.  · Normal right ventricular size with normal right ventricular systolic   function.  · Left ventricular diastolic dysfunction.  · Biatrial enlargement.  · Mild tricuspid regurgitation.  · The estimated PA systolic pressure is 38 mmHg.  · Normal central venous pressure (3 mmHg).         Taking Eliquis for stroke prophylaxis -- last dose at 1pm today-- dose prior at 9am yesterday.      Procedure(s) (LRB):  Cardioversion or Defibrillation (N/A)  Transesophageal echo (MYLES) intra-procedure log documentation (N/A)     Transesophageal echo (MYLES)  Result Date: 10/5/2023    MYLES to evaluate ROOSEVELT prior to DCCV. Unable to complete full study due to patient's tenuous respiratory status.   Left Atrium: The left atrial appendage appears is not well visualized due to suspected partial or total resection of the appendage. There is no thrombus in the left atrial appendage.     Electrophysiology Procedure  Result Date: 10/5/2023    Cardioversion was successfully performed with restoration of normal sinus rhythm. I certify that I was present for the critical steps of the procedure including the diagnostic, surgical and/or interventional portions. Procedure Log documented by Documenter: Jemma Cedeno RN and verified by A Cristine Ruff MD. Date: 10/5/2023  Time: 2:00 PM    Hospital Course:  S/p  MYLES/DCCV  for atrial fibrillation. MYLES: ROOSEVELT: no clot.  Underwent successful DCCV with conversion to sinus rhythm. Post DCCV EKG: Sinus rhythm; ventricular rate: 65 bpm.  Tolerated procedure.  Awake and alert. Without complaints.  D/c instructions provided to patient, specifically to continue anticoagulant Eliquis; stop digoxin; start amio load.  Patient verbalized understanding.        Goals of Care Treatment Preferences:     Consults: Cardiology for MYLES    Significant Diagnostic Studies: Cardiac Graphics: ECG: Sinus Rhythm    Pending Diagnostic Studies:       None          Final Active Diagnoses:    Diagnosis Date Noted POA    PRINCIPAL PROBLEM:  AF (atrial fibrillation) [I48.91] 10/17/2022 Yes    Ischemic cardiomyopathy [I25.5] 10/17/2022 Yes      Problems Resolved During this Admission:     Cardiac/Vascular  * AF (atrial fibrillation)  S/p MYLES/DCCV ---  successful conversion to Sinus Rhythm  Continue Eliquis  Start amio load  Stop digoxin  EKG 10/12/23-  F/u NP/MD in 4 weeks        Discharged Condition: stable    Disposition: Home or Self Care    Follow Up:   Follow-up Information       NATASHA PARKER. Go on 10/12/2023.    Why: EKG--post cardioversion             Florencia Espinal NP. Schedule an appointment as soon as possible for a visit in 4 week(s).    Specialty: Cardiology  Why: Follow up post cardioversion  Contact information:  [address] [facility] [address]  [phone]                           Patient Instructions:      Diet Cardiac     No driving until:   Order Comments: As instructed by Anesthesia post sedation     No dressing needed     Notify your health care provider if you experience any of the following:  temperature >100.4     Notify your health care provider if you experience any of the following:  persistent nausea and vomiting or diarrhea     Notify your health care provider if you experience any of the following:  severe uncontrolled pain     Notify your health care provider if you experience any of the following:  redness, tenderness, or signs of infection (pain, swelling, redness, odor or green/yellow discharge around incision site)      Notify your health care provider if you experience any of the following:  difficulty breathing or increased cough     Notify your health care provider if you experience any of the following:  severe persistent headache     Notify your health care provider if you experience any of the following:  worsening rash     Notify your health care provider if you experience any of the following:  persistent dizziness, light-headedness, or visual disturbances     Notify your health care provider if you experience any of the following:  increased confusion or weakness     Activity as tolerated     Medications:  Reconciled Home Medications:      Medication List        START taking these medications      * amiodarone 200 MG Tab  Commonly known as: PACERONE  Take 2 tablets (400 mg total) by mouth 2 (two) times daily for 14 days, THEN 1 tablet (200 mg total) once daily for 14 days.  Start taking on: October 5, 2023     * amiodarone 200 MG Tab  Commonly known as: PACERONE  Take 1 tablet (200 mg total) by mouth once daily.  Start taking on: November 2, 2023           * This list has 2 medication(s) that are the same as other medications prescribed for you. Read the directions carefully, and ask your doctor or other care provider to review them with you.                CONTINUE taking these medications      apixaban 5 mg Tab  Commonly known as: ELIQUIS  Take 1 tablet (5 mg total) by mouth 2 (two) times daily.     atorvastatin 40 MG tablet  Commonly known as: LIPITOR  Take 40 mg by mouth once daily.     carvediloL 25 MG tablet  Commonly known as: COREG  Take 1 tablet (25 mg total) by mouth 2 (two) times daily with meals.     diazePAM 2 MG tablet  Commonly known as: VALIUM  Take 2 mg by mouth 3 (three) times daily as needed.     ENTRESTO  mg per tablet  Generic drug: sacubitriL-valsartan  Take 1 tablet by mouth 2 (two) times daily.     EScitalopram oxalate 20 MG tablet  Commonly known as: LEXAPRO  Take 20 mg by mouth once  daily.     FARXIGA 10 mg tablet  Generic drug: dapagliflozin propanediol  Take 1 tablet (10 mg total) by mouth once daily.     ferrous sulfate 325 (65 FE) MG EC tablet  Take 325 mg by mouth once daily.     furosemide 40 MG tablet  Commonly known as: LASIX  Take 1 tablet (40 mg total) by mouth as needed (for weight gain >3 lbs in one day or >5lbs in one week).     hydrALAZINE 25 MG tablet  Commonly known as: APRESOLINE  Take 1 tablet (25 mg total) by mouth 2 (two) times a day.     isosorbide mononitrate 30 MG 24 hr tablet  Commonly known as: IMDUR  Take 2 tablets (60 mg total) by mouth once daily.     QVAR REDIHALER 40 mcg/actuation Hfab  Generic drug: beclomethasone dipropionate  Inhale 80 mcg into the lungs 2 (two) times daily.     spironolactone 25 MG tablet  Commonly known as: ALDACTONE  Take 1 tablet (25 mg total) by mouth once daily.            STOP taking these medications      digoxin 125 mcg tablet  Commonly known as: LANOXIN            Time spent on the discharge of patient: 20 minutes    Kamini Garcia NP  Cardiac Electrophysiology  [facility] - Cardiology

## 2023-10-05 NOTE — ASSESSMENT & PLAN NOTE
Presents today for MYLES/DCCV. Risks and benefits and alternatives of procedure discussed with patient.  Risks discussed included, but not limited to death, brain damage, stroke, low blood pressure/heart rate, arrhythmia, need for pacemaker, and burning sensation/redness/irritation on chest/back area where pads were placed.  All Questions answered. Patient would like to proceed.     Taking Eliquis for stroke prophylaxis.  Dose Eliquis given at 1pm as last dose prior was at 9am.   Last dose coreg and digoxin was yesterday

## 2023-10-05 NOTE — H&P
[facility] Card[facility]  Cardiac Electrophysiology  History and Physical     Admission Date: 10/5/2023  Code Status: No Order   Attending Provider: Rob Galan MD   Principal Problem:AF (atrial fibrillation)    Subjective:     Chief Complaint:  Presents for MYLES/DCCV     HPI:  Wai Lopez 48 y.o. male presents for MYLES/DCCV.  Patient has a history of chronic ischemic CMP (LVEF 20%, NYHA class II) s/p MI and CABG on 7/14/2021 complicated by tamponade requiring a pericardial window 2 weeks later, AVNRT s/p slow pathway modification in 2017 by Dr. Celestin, post CABG persistent AF, major GI bleed on aspirin/eliquis in 2022 requiring 4u PRBCs (endoscopy revealed no source) and CKD stage 3a. He has been on GDMT since 10/17/2022. LVEF has not recovered. He was on amiodarone previously. He reports he has been in AF for 1.5 years. He reports he has never had attempt at rhythm control with his atrial fibrillation.     3/2023 ECHO: LVEF 20%, LVEDD 7.5cm (ECHO 7/2022 from outside hospital: LVEF 15-20%, ECHO in 2/2020 LVEF 35-40%)    Echo    Result Date: 9/26/2023    Left Ventricle: The left ventricle is mildly dilated. Normal wall   thickness. There is mild concentric hypertrophy. Normal wall motion. There   is moderately reduced systolic function with a visually estimated ejection   fraction of 35 - 40%. Significant beat to beat variability in EF.  Biplane   (2D) method of discs ejection fraction is 40%. Unable to assess due to   atrial fibrillation. Wall motion is difficult to identify in the setting   of AF    The following segments are hypokinetic: basal inferior, basal   inferolateral, basal anterolateral, mid inferolateral, mid anterolateral   and apical lateral.    Right Ventricle: Normal right ventricular cavity size. Wall thickness   is normal. Right ventricle wall motion  is normal. Systolic function is   normal.    Left Atrium: Left atrium is dilated.    Aortic Valve: The aortic valve is a  trileaflet valve. There is mild   annular calcification present.    Pulmonary Artery: The estimated pulmonary artery systolic pressure is   23 mmHg.    IVC/SVC: Normal venous pressure at 3 mmHg.        Echo    Result Date: 3/6/2023  · The left ventricle is severely enlarged with eccentric hypertrophy and   severely decreased systolic function. The estimated ejection fraction is   20%.  · Normal right ventricular size with normal right ventricular systolic   function.  · Left ventricular diastolic dysfunction.  · Biatrial enlargement.  · Mild tricuspid regurgitation.  · The estimated PA systolic pressure is 38 mmHg.  · Normal central venous pressure (3 mmHg).         Taking Eliquis for stroke prophylaxis -- last dose at 1pm today-- dose prior at 9am yesterday.      Review of patient's allergies indicates:  No Known Allergies    No current facility-administered medications on file prior to encounter.     Current Outpatient Medications on File Prior to Encounter   Medication Sig    apixaban (ELIQUIS) 5 mg Tab Take 1 tablet (5 mg total) by mouth 2 (two) times daily.    atorvastatin (LIPITOR) 40 MG tablet Take 40 mg by mouth once daily.    carvediloL (COREG) 25 MG tablet Take 1 tablet (25 mg total) by mouth 2 (two) times daily with meals.    digoxin (LANOXIN) 125 mcg tablet Take 0.125 mg by mouth once daily.    EScitalopram oxalate (LEXAPRO) 20 MG tablet Take 20 mg by mouth once daily.    ferrous sulfate 325 (65 FE) MG EC tablet Take 325 mg by mouth once daily.    sacubitriL-valsartan (ENTRESTO)  mg per tablet Take 1 tablet by mouth 2 (two) times daily.    spironolactone (ALDACTONE) 25 MG tablet Take 1 tablet (25 mg total) by mouth once daily.    beclomethasone dipropionate (QVAR REDIHALER) 40 mcg/actuation HFAB Inhale 80 mcg into the lungs 2 (two) times daily.    diazePAM (VALIUM) 2 MG tablet Take 2 mg by mouth 3 (three) times daily as needed.     Family History    None       Tobacco Use    Smoking  status: Former     Types: Cigarettes     Passive exposure: Never    Smokeless tobacco: Never   Substance and Sexual Activity    Alcohol use: Not on file    Drug use: Not on file    Sexual activity: Not on file     Review of Systems   Constitutional: Negative for fever and malaise/fatigue.   Eyes:  Negative for visual disturbance.   Cardiovascular:  Positive for dyspnea on exertion and irregular heartbeat. Negative for chest pain, near-syncope and syncope.   Respiratory:  Negative for cough and shortness of breath.    Skin:  Negative for rash.   Neurological:  Negative for focal weakness and light-headedness.     Objective:     Vital Signs (Most Recent):  Temp: 97.9 °F (36.6 °C) (10/05/23 1100)  Pulse: 67 (10/05/23 1100)  Resp: 17 (10/05/23 1100)  BP: 132/78 (10/05/23 1149)  SpO2: 98 % (10/05/23 1100) Vital Signs (24h Range):  Temp:  [97.9 °F (36.6 °C)] 97.9 °F (36.6 °C)  Pulse:  [67] 67  Resp:  [17] 17  SpO2:  [98 %] 98 %  BP: (131-132)/(72-78) 132/78       Weight: 97.5 kg (215 lb)  Body mass index is 29.99 kg/m².    SpO2: 98 %        Physical Exam  Vitals and nursing note reviewed.   Constitutional:       General: He is not in acute distress.     Appearance: He is not ill-appearing.   Cardiovascular:      Rate and Rhythm: Rhythm irregular.      Pulses:           Radial pulses are 2+ on the right side and 2+ on the left side.   Pulmonary:      Effort: Pulmonary effort is normal. No respiratory distress.   Musculoskeletal:      Right lower leg: Normal. No swelling. No edema.      Left lower leg: Normal. No swelling. No edema.   Neurological:      Mental Status: He is alert and oriented to person, place, and time.          Significant Labs:   CBC/BMP 9/26/23: Ok; notable for BUN/Crt: 36/2.1-- prior few months range: 21-32/1.5-2.2    Results for orders placed or performed during the hospital encounter of 10/05/23   Protime-INR (PT/INR)   Result Value Ref Range    Prothrombin Time 10.9 9.0 - 12.5 sec    INR 1.0 0.8  - 1.2   APTT   Result Value Ref Range    aPTT 30.8 21.0 - 32.0 sec     Significant Imaging: Atrial Fibrillation; 60 bpm    Assessment and Plan:     * AF (atrial fibrillation)  Presents today for MYLES/DCCV. Risks and benefits and alternatives of procedure discussed with patient.  Risks discussed included, but not limited to death, brain damage, stroke, low blood pressure/heart rate, arrhythmia, need for pacemaker, and burning sensation/redness/irritation on chest/back area where pads were placed.  All Questions answered. Patient would like to proceed.     Taking Eliquis for stroke prophylaxis.  Dose Eliquis given at 1pm as last dose prior was at 9am.   Last dose coreg and digoxin was yesterday         Kamini Garcia NP  Cardiac Electrophysiology  [facility] Card[facility]

## 2023-10-05 NOTE — DISCHARGE INSTRUCTIONS
Because you have received sedation for this procedure:  -Limit activity for the remainder of the day.  -Do not smoke for at least 6 hours and until you are fully awake and alert.  -Do not drink alcoholic beverage for 24 hours.  -Do not drive for 24 hours.  -Defer important decision making until the following day.     Go to the Emergency Department if you develop:   -Bleeding  -Weakness or numbness  -Visual, gait or speech disturbance  -New chest pain, palpitations, shortness of breath, rapid heart beat, or fainting  -Fever    If you have a pacemaker, defibrillator or loop recorder that is monitored remotely by the [facility] clinic, you may be eligible to send in a remote transmission on the day of your EKG appointment instead of presenting in person. Please call [phone] if you would prefer to do this rather than come in for your EKG appointment.  If you are eligible, please send a manual transmission from your home monitor one week after your procedure and then call the clinic at [phone], option 4, to confirm that your remote transmission was received.    Any need to reschedule or cancel procedures, or any questions regarding your procedures should be addressed directly with the Arrhythmia Department Nurses at the following phone number: [phone].

## 2023-10-05 NOTE — PROGRESS NOTES
Patient admitted to recovery see [facility] for complete assessment pacu bcg' smaintained safety measures verified patient instructed on pain scale and patient verbalized understanding. Called for ekg and it was done and in chart.

## 2023-10-05 NOTE — NURSING TRANSFER
Nursing Transfer Note      10/5/2023   4:32 PM    Nurse giving handoff:nathanael herring   Nurse receiving handoff:michelle peguerou rn     Reason patient is being transferred: d/c critieria met     Transfer To: s[facility] 3    Transfer via stretcher    Transfer with silvadene cream     Transported by nathanael rn     Transfer Vital Signs:  Blood Pressure:see epic   Heart Rate:see epic   O2:room air   Temperature:see epic   Respirations:see epic     Telemetry: yes in pacu   Order for Tele Monitor? Yes in pacu     Additional Lines: none     4eyes on Skin: yes in pacu     Medicines sent: yes  silvadene cream     Any special needs or follow-up needed: none     Patient belongings transferred with patient:  n/a    Chart send with patient: Yes    Notified: friend called again at 1621 and let her know patient moving to sscu     Patient reassessed at: see epic  (date, time)  1  Upon arrival to floor: to room no distress no complaints noted.

## 2023-10-05 NOTE — PLAN OF CARE
Received via stretcher from  PACU.  Report from CAMERON Padron.  Awake and alert.  No c/o pain or SOB.  VSS.  Family at pt bedside.  Food and drinks provided.  Will continue to monitor.  Call bell provided and oriented to room .

## 2023-10-05 NOTE — TRANSFER OF CARE
"Anesthesia Transfer of Care Note    Patient: Wai Lopez    Procedure(s) Performed: Procedure(s) (LRB):  Cardioversion or Defibrillation (N/A)  Transesophageal echo (MYLES) intra-procedure log documentation (N/A)    Patient location: PACU    Anesthesia Type: general    Transport from OR: Transported from OR on 6-10 L/min O2 by face mask with adequate spontaneous ventilation    Post pain: adequate analgesia    Post assessment: no apparent anesthetic complications    Post vital signs: stable    Level of consciousness: awake    Nausea/Vomiting: no nausea/vomiting    Complications: none    Transfer of care protocol was followed      Last vitals:   Visit Vitals  /78 (BP Location: Left arm, Patient Position: Sitting)   Pulse 67   Temp 36.6 °C (97.9 °F)   Resp 17   Ht 5' 11" (1.803 m)   Wt 97.5 kg (215 lb)   SpO2 98%   BMI 29.99 kg/m²     "

## 2023-10-05 NOTE — NURSING
Off unit via wheelchair for discharge home.  Pt accompanied by spouse.  Ambulating without difficulty.  VSS.  Denies pain or SOB.

## 2023-10-05 NOTE — SUBJECTIVE & OBJECTIVE
History reviewed. No pertinent past medical history.    History reviewed. No pertinent surgical history.    Review of patient's allergies indicates:  No Known Allergies    No current facility-administered medications on file prior to encounter.     Current Outpatient Medications on File Prior to Encounter   Medication Sig    apixaban (ELIQUIS) 5 mg Tab Take 1 tablet (5 mg total) by mouth 2 (two) times daily.    atorvastatin (LIPITOR) 40 MG tablet Take 40 mg by mouth once daily.    carvediloL (COREG) 25 MG tablet Take 1 tablet (25 mg total) by mouth 2 (two) times daily with meals.    digoxin (LANOXIN) 125 mcg tablet Take 0.125 mg by mouth once daily.    EScitalopram oxalate (LEXAPRO) 20 MG tablet Take 20 mg by mouth once daily.    ferrous sulfate 325 (65 FE) MG EC tablet Take 325 mg by mouth once daily.    sacubitriL-valsartan (ENTRESTO)  mg per tablet Take 1 tablet by mouth 2 (two) times daily.    spironolactone (ALDACTONE) 25 MG tablet Take 1 tablet (25 mg total) by mouth once daily.    beclomethasone dipropionate (QVAR REDIHALER) 40 mcg/actuation HFAB Inhale 80 mcg into the lungs 2 (two) times daily.    diazePAM (VALIUM) 2 MG tablet Take 2 mg by mouth 3 (three) times daily as needed.     Family History    None       Tobacco Use    Smoking status: Former     Types: Cigarettes     Passive exposure: Never    Smokeless tobacco: Never   Substance and Sexual Activity    Alcohol use: Not on file    Drug use: Not on file    Sexual activity: Not on file     Review of Systems   Constitutional: Negative for fever and malaise/fatigue.   Eyes:  Negative for visual disturbance.   Cardiovascular:  Positive for dyspnea on exertion and irregular heartbeat. Negative for chest pain, near-syncope and syncope.   Respiratory:  Negative for cough and shortness of breath.    Skin:  Negative for rash.   Neurological:  Negative for focal weakness and light-headedness.     Objective:     Vital Signs (Most Recent):  Temp: 97.9 °F (36.6  °C) (10/05/23 1100)  Pulse: 67 (10/05/23 1100)  Resp: 17 (10/05/23 1100)  BP: 132/78 (10/05/23 1149)  SpO2: 98 % (10/05/23 1100) Vital Signs (24h Range):  Temp:  [97.9 °F (36.6 °C)] 97.9 °F (36.6 °C)  Pulse:  [67] 67  Resp:  [17] 17  SpO2:  [98 %] 98 %  BP: (131-132)/(72-78) 132/78       Weight: 97.5 kg (215 lb)  Body mass index is 29.99 kg/m².    SpO2: 98 %        Physical Exam  Vitals and nursing note reviewed.   Constitutional:       General: He is not in acute distress.     Appearance: He is not ill-appearing.   Cardiovascular:      Rate and Rhythm: Rhythm irregular.      Pulses:           Radial pulses are 2+ on the right side and 2+ on the left side.   Pulmonary:      Effort: Pulmonary effort is normal. No respiratory distress.   Musculoskeletal:      Right lower leg: Normal. No swelling. No edema.      Left lower leg: Normal. No swelling. No edema.   Neurological:      Mental Status: He is alert and oriented to person, place, and time.          Significant Labs:   CBC/BMP 9/26/23: Ok; notable for BUN/Crt: 36/2.1-- prior few months range: 21-32/1.5-2.2    Results for orders placed or performed during the hospital encounter of 10/05/23   Protime-INR (PT/INR)   Result Value Ref Range    Prothrombin Time 10.9 9.0 - 12.5 sec    INR 1.0 0.8 - 1.2   APTT   Result Value Ref Range    aPTT 30.8 21.0 - 32.0 sec     Significant Imaging: Atrial Fibrillation; 60 bpm

## 2023-10-05 NOTE — CONSULTS
[facility], [address]  MYLES Consult      Wai Chung Lopez  YOB: 1975  Medical Record Number:  [id number]  Attending Physician:  Rob Galan MD   Date of Admission: 10/5/2023       Hospital Day:  0  Current Principal Problem:  AF (atrial fibrillation)      History     Cc: Atrial fibrillation    HPI  I had the pleasure of seeing Mr. Lopez today in our electrophysiology clinic in consultation for his cardiomyopathy and risk of sudden death. As you are aware he is a pleasant 47 year-old man with chronic ischemic CMP (LVEF 20%, NYHA class II) s/p MI and CABG on 7/14/2021 complicated by tamponade requiring a pericardial window 2 weeks later, AVNRT s/p slow pathway modification in 2017 by Dr. Celestin, post CABG persistent AF, major GI bleed on aspirin/eliquis in 2022 requiring 4u PRBCs (endoscopy revealed no source) and CKD stage 3a. He has been on GDMT since 10/17/2022. LVEF has not recovered. He was on amiodarone previously. PET stress test is pending (4/19/2023). He reports he has been in AF for 1.5 years. He reports he has never had attempt at rhythm control with his atrial fibrillation.     3/2023 ECHO: LVEF 20%, LVEDD 7.5cm (ECHO 7/2022 from outside hospital: LVEF 15-20%, ECHO in 2/2020 LVEF 35-40%)     My interpretation of today's in clinic ECG is atrial fibrillation with a left bundle type IVCD.    Today, he presents for MYLES/DCCV.       Anticoagulant/antiplatelets: apixaban   ECG: atrial fibrillation     Dysphagia or odynophagia:  no  Liver Disease, esophageal disease, or known varices:  no  Upper GI Bleeding: no  Snoring:  no  Sleep Apnea:  no  Prior neck surgery or radiation:  no  History of anesthetic difficulties:  no  Family history of anesthetic difficulties:  no  Last oral intake: last pm   Able to move neck in all directions: yes  Platelet count: 193,000  INR: 1.0        Medications - Outpatient  Prior to Admission medications    Medication Sig Start Date End Date  Taking? Authorizing Provider   apixaban (ELIQUIS) 5 mg Tab Take 1 tablet (5 mg total) by mouth 2 (two) times daily. 4/4/23  Yes Rob Galan MD   atorvastatin (LIPITOR) 40 MG tablet Take 40 mg by mouth once daily. 10/3/22  Yes Provider, Historical   carvediloL (COREG) 25 MG tablet Take 1 tablet (25 mg total) by mouth 2 (two) times daily with meals. 8/15/23 2/5/25 Yes Monique Daniel, DO   dapagliflozin propanediol (FARXIGA) 10 mg tablet Take 1 tablet (10 mg total) by mouth once daily. 9/26/23  Yes Monique Daniel DO   digoxin (LANOXIN) 125 mcg tablet Take 0.125 mg by mouth once daily. 9/13/22  Yes Provider, Historical   EScitalopram oxalate (LEXAPRO) 20 MG tablet Take 20 mg by mouth once daily. 10/3/22  Yes Provider, Historical   ferrous sulfate 325 (65 FE) MG EC tablet Take 325 mg by mouth once daily.   Yes Provider, Historical   furosemide (LASIX) 40 MG tablet Take 1 tablet (40 mg total) by mouth as needed (for weight gain >3 lbs in one day or >5lbs in one week). 9/26/23  Yes Monique Daniel DO   hydrALAZINE (APRESOLINE) 25 MG tablet Take 1 tablet (25 mg total) by mouth 2 (two) times a day. 9/26/23 9/25/24 Yes Monique Daniel DO   isosorbide mononitrate (IMDUR) 30 MG 24 hr tablet Take 2 tablets (60 mg total) by mouth once daily. 9/26/23  Yes Monique Daniel DO   sacubitriL-valsartan (ENTRESTO)  mg per tablet Take 1 tablet by mouth 2 (two) times daily. 7/7/23  Yes Monique Daniel DO   spironolactone (ALDACTONE) 25 MG tablet Take 1 tablet (25 mg total) by mouth once daily. 8/15/23 7/30/26 Yes Monique Daniel DO   beclomethasone dipropionate (QVAR REDIHALER) 40 mcg/actuation HFAB Inhale 80 mcg into the lungs 2 (two) times daily.    Provider, Historical   diazePAM (VALIUM) 2 MG tablet Take 2 mg by mouth 3 (three) times daily as needed. 11/9/22   Provider, Historical         Medications - Current  Scheduled Meds:   sodium chloride 0.9%  1,000 mL Intravenous Once  "    Continuous Infusions:  PRN Meds:.      Allergies  Review of patient's allergies indicates:  No Known Allergies      Past Medical History  History reviewed. No pertinent past medical history.      Past Surgical History  History reviewed. No pertinent surgical history.      Social History  Social History     Socioeconomic History    Marital status:    Tobacco Use    Smoking status: Former     Types: Cigarettes     Passive exposure: Never    Smokeless tobacco: Never         ROS  10 point ROS performed and negative except as stated in HPI     Physical Examination         Vital Signs  Vitals  Temp: 97.9 °F (36.6 °C)  Pulse: 67  Resp: 17  SpO2: 98 %  BP: 132/78  BP Location: Left arm  BP Method: Automatic  Patient Position: Sitting          24 Hour VS Range    Temp:  [97.9 °F (36.6 °C)]   Pulse:  [67]   Resp:  [17]   BP: (131-132)/(72-78)   SpO2:  [98 %]   No intake or output data in the 24 hours ending 10/05/23 1304      Physical Exam:   Constitutional: no acute distress  HEENT: NCAT, EOMI, no scleral icterus  Cardiovascular: Regular rate and rhythm  Pulmonary: Normal respiratory effort   Abdomen: nontender, non-distended   Neuro: alert and oriented, no focal deficits  Extremities: warm, no edema   MSK: no deformities  Integument: intact, no rashes           Data       No results for input(s): "WBC", "HGB", "HCT", "PLT" in the last 168 hours.     Recent Labs   Lab 10/05/23  1115   INR 1.0        No results for input(s): "NA", "K", "CL", "CO2", "BUN", "CREATININE", "ANIONGAP", "CALCIUM" in the last 168 hours.     No results for input(s): "PROT", "ALBUMIN", "BILITOT", "ALKPHOS", "AST", "ALT" in the last 168 hours.     No results for input(s): "TROPONINI" in the last 168 hours.     BNP (pg/mL)   Date Value   09/26/2023 382 (H)   08/15/2023 882 (H)   07/07/2023 2,027 (H)   03/06/2023 760 (H)   01/27/2023 1,772 (H)       No results for input(s): "LABBLOO" in the last 168 hours.         Assessment & Plan     Atrial " fibrillation  Proceed with MYLES/ DCCV         -No absolute contraindications of esophageal stricture, tumor, perforation, laceration,or diverticulum and/or active GI bleed  -The risks, benefits & alternatives of the procedure were explained to the patient.   -The risks of transesophageal echo include but are not limited to:  Dental trauma, esophageal trauma/perforation, bleeding, laryngospasm/brochospasm, aspiration, sore throat/hoarseness, & dislodgement of the endotracheal tube/nasogastric tube (where applicable).    -The risks of moderate sedation include hypotension, respiratory depression, arrhythmias, bronchospasm, & death.    -Informed consent was obtained. The patient is agreeable to proceed with the procedure and all questions and concerns addressed    Danii Guerrero MD  [facility]   Cardiovascular Disease PGY-V

## 2023-10-05 NOTE — HPI
Wai Lopez 48 y.o. male presents for MYLES/DCCV.  Patient has a history of chronic ischemic CMP (LVEF 20%, NYHA class II) s/p MI and CABG on 7/14/2021 complicated by tamponade requiring a pericardial window 2 weeks later, AVNRT s/p slow pathway modification in 2017 by Dr. Celestin, post CABG persistent AF, major GI bleed on aspirin/eliquis in 2022 requiring 4u PRBCs (endoscopy revealed no source) and CKD stage 3a. He has been on GDMT since 10/17/2022. LVEF has not recovered. He was on amiodarone previously. He reports he has been in AF for 1.5 years. He reports he has never had attempt at rhythm control with his atrial fibrillation.     3/2023 ECHO: LVEF 20%, LVEDD 7.5cm (ECHO 7/2022 from outside hospital: LVEF 15-20%, ECHO in 2/2020 LVEF 35-40%)    Echo    Result Date: 9/26/2023    Left Ventricle: The left ventricle is mildly dilated. Normal wall   thickness. There is mild concentric hypertrophy. Normal wall motion. There   is moderately reduced systolic function with a visually estimated ejection   fraction of 35 - 40%. Significant beat to beat variability in EF.  Biplane   (2D) method of discs ejection fraction is 40%. Unable to assess due to   atrial fibrillation. Wall motion is difficult to identify in the setting   of AF    The following segments are hypokinetic: basal inferior, basal   inferolateral, basal anterolateral, mid inferolateral, mid anterolateral   and apical lateral.    Right Ventricle: Normal right ventricular cavity size. Wall thickness   is normal. Right ventricle wall motion  is normal. Systolic function is   normal.    Left Atrium: Left atrium is dilated.    Aortic Valve: The aortic valve is a trileaflet valve. There is mild   annular calcification present.    Pulmonary Artery: The estimated pulmonary artery systolic pressure is   23 mmHg.    IVC/SVC: Normal venous pressure at 3 mmHg.        Echo    Result Date: 3/6/2023  · The left ventricle is severely enlarged with eccentric  hypertrophy and   severely decreased systolic function. The estimated ejection fraction is   20%.  · Normal right ventricular size with normal right ventricular systolic   function.  · Left ventricular diastolic dysfunction.  · Biatrial enlargement.  · Mild tricuspid regurgitation.  · The estimated PA systolic pressure is 38 mmHg.  · Normal central venous pressure (3 mmHg).         Taking Eliquis for stroke prophylaxis -- last dose at 1pm today-- dose prior at 9am yesterday.

## 2023-10-05 NOTE — ASSESSMENT & PLAN NOTE
S/p MYLES/DCCV ---  successful conversion to Sinus Rhythm  Continue Eliquis  Start amio load  Stop digoxin  EKG 10/12/23-  F/u NP/MD in 4 weeks

## 2023-10-06 ENCOUNTER — PATIENT MESSAGE (OUTPATIENT)
Dept: ELECTROPHYSIOLOGY | Facility: CLINIC | Age: 48
End: 2023-10-06
Payer: MEDICAID

## 2023-10-06 NOTE — ANESTHESIA POSTPROCEDURE EVALUATION
Anesthesia Post Evaluation    Patient: Wai Chung Lopez    Procedure(s) Performed: Procedure(s) (LRB):  Cardioversion or Defibrillation (N/A)  Transesophageal echo (MYLES) intra-procedure log documentation (N/A)    Final Anesthesia Type: general      Level of consciousness: awake and alert  Post-procedure vital signs: reviewed and stable  Pain control: Pain has been treated.  Airway patency: patent    PONV status: Absent or treated.  Anesthetic complications: no      Cardiovascular status: hemodynamically stable  Respiratory status: unassisted  Hydration status: euvolemic            Vitals Value Taken Time   /64 10/05/23 1633   Temp 36.4 °C (97.6 °F) 10/05/23 1633   Pulse 58 10/05/23 1633   Resp 17 10/05/23 1633   SpO2 96 % 10/05/23 1633         No case tracking events are documented in the log.      Pain/Jayson Score: Jayson Score: 10 (10/5/2023  4:33 PM)

## 2023-10-09 LAB — BSA FOR ECHO PROCEDURE: 2.21 M2

## 2023-11-16 ENCOUNTER — TELEPHONE (OUTPATIENT)
Dept: ELECTROPHYSIOLOGY | Facility: CLINIC | Age: 48
End: 2023-11-16
Payer: MEDICAID

## 2024-05-06 ENCOUNTER — TELEPHONE (OUTPATIENT)
Dept: ELECTROPHYSIOLOGY | Facility: CLINIC | Age: 49
End: 2024-05-06
Payer: MEDICARE

## 2024-05-06 DIAGNOSIS — I48.19 PERSISTENT ATRIAL FIBRILLATION: Primary | ICD-10-CM

## 2024-06-04 ENCOUNTER — HOSPITAL ENCOUNTER (OUTPATIENT)
Dept: CARDIOLOGY | Facility: CLINIC | Age: 49
Discharge: HOME | End: 2024-06-04
Payer: MEDICARE

## 2024-06-04 ENCOUNTER — LAB VISIT (OUTPATIENT)
Dept: LAB | Facility: HOSPITAL | Age: 49
End: 2024-06-04
Attending: INTERNAL MEDICINE
Payer: MEDICARE

## 2024-06-04 ENCOUNTER — OFFICE VISIT (OUTPATIENT)
Dept: ELECTROPHYSIOLOGY | Facility: CLINIC | Age: 49
End: 2024-06-04
Payer: MEDICARE

## 2024-06-04 VITALS
SYSTOLIC BLOOD PRESSURE: 130 MMHG | BODY MASS INDEX: 34.45 KG/M2 | DIASTOLIC BLOOD PRESSURE: 77 MMHG | HEIGHT: 71 IN | HEART RATE: 63 BPM | WEIGHT: 246.06 LBS

## 2024-06-04 DIAGNOSIS — I10 ESSENTIAL (PRIMARY) HYPERTENSION: ICD-10-CM

## 2024-06-04 DIAGNOSIS — I48.19 PERSISTENT ATRIAL FIBRILLATION: ICD-10-CM

## 2024-06-04 DIAGNOSIS — N18.31 STAGE 3A CHRONIC KIDNEY DISEASE: ICD-10-CM

## 2024-06-04 DIAGNOSIS — I25.5 ISCHEMIC CARDIOMYOPATHY: Primary | ICD-10-CM

## 2024-06-04 DIAGNOSIS — I25.5 ISCHEMIC CARDIOMYOPATHY: ICD-10-CM

## 2024-06-04 LAB
ALBUMIN SERPL BCP-MCNC: 3.4 G/DL (ref 3.5–5.2)
ALP SERPL-CCNC: 77 U/L (ref 55–135)
ALT SERPL W/O P-5'-P-CCNC: 9 U/L (ref 10–44)
ANION GAP SERPL CALC-SCNC: 7 MMOL/L (ref 8–16)
AST SERPL-CCNC: 12 U/L (ref 10–40)
BASOPHILS # BLD AUTO: 0.08 K/UL (ref 0–0.2)
BASOPHILS NFR BLD: 0.9 % (ref 0–1.9)
BILIRUB SERPL-MCNC: 0.6 MG/DL (ref 0.1–1)
BUN SERPL-MCNC: 24 MG/DL (ref 6–20)
CALCIUM SERPL-MCNC: 9.4 MG/DL (ref 8.7–10.5)
CHLORIDE SERPL-SCNC: 108 MMOL/L (ref 95–110)
CO2 SERPL-SCNC: 23 MMOL/L (ref 23–29)
CREAT SERPL-MCNC: 2 MG/DL (ref 0.5–1.4)
DIFFERENTIAL METHOD BLD: ABNORMAL
EOSINOPHIL # BLD AUTO: 0.4 K/UL (ref 0–0.5)
EOSINOPHIL NFR BLD: 4.1 % (ref 0–8)
ERYTHROCYTE [DISTWIDTH] IN BLOOD BY AUTOMATED COUNT: 15.5 % (ref 11.5–14.5)
EST. GFR  (NO RACE VARIABLE): 39.9 ML/MIN/1.73 M^2
GLUCOSE SERPL-MCNC: 103 MG/DL (ref 70–110)
HCT VFR BLD AUTO: 45.5 % (ref 40–54)
HGB BLD-MCNC: 14.7 G/DL (ref 14–18)
IMM GRANULOCYTES # BLD AUTO: 0.03 K/UL (ref 0–0.04)
IMM GRANULOCYTES NFR BLD AUTO: 0.3 % (ref 0–0.5)
LYMPHOCYTES # BLD AUTO: 1.6 K/UL (ref 1–4.8)
LYMPHOCYTES NFR BLD: 18.5 % (ref 18–48)
MCH RBC QN AUTO: 27.5 PG (ref 27–31)
MCHC RBC AUTO-ENTMCNC: 32.3 G/DL (ref 32–36)
MCV RBC AUTO: 85 FL (ref 82–98)
MONOCYTES # BLD AUTO: 0.8 K/UL (ref 0.3–1)
MONOCYTES NFR BLD: 8.9 % (ref 4–15)
NEUTROPHILS # BLD AUTO: 5.8 K/UL (ref 1.8–7.7)
NEUTROPHILS NFR BLD: 67.3 % (ref 38–73)
NRBC BLD-RTO: 0 /100 WBC
OHS QRS DURATION: 154 MS
OHS QTC CALCULATION: 456 MS
PLATELET # BLD AUTO: 197 K/UL (ref 150–450)
PMV BLD AUTO: 11.3 FL (ref 9.2–12.9)
POTASSIUM SERPL-SCNC: 4.9 MMOL/L (ref 3.5–5.1)
PROT SERPL-MCNC: 6.5 G/DL (ref 6–8.4)
RBC # BLD AUTO: 5.34 M/UL (ref 4.6–6.2)
SODIUM SERPL-SCNC: 138 MMOL/L (ref 136–145)
TSH SERPL DL<=0.005 MIU/L-ACNC: 0.88 UIU/ML (ref 0.4–4)
WBC # BLD AUTO: 8.64 K/UL (ref 3.9–12.7)

## 2024-06-04 PROCEDURE — 85025 COMPLETE CBC W/AUTO DIFF WBC: CPT | Performed by: INTERNAL MEDICINE

## 2024-06-04 PROCEDURE — 80053 COMPREHEN METABOLIC PANEL: CPT | Performed by: INTERNAL MEDICINE

## 2024-06-04 PROCEDURE — 3008F BODY MASS INDEX DOCD: CPT | Mod: CPTII,S$GLB,, | Performed by: INTERNAL MEDICINE

## 2024-06-04 PROCEDURE — 99999 PR PBB SHADOW E&M-EST. PATIENT-LVL III: CPT | Mod: PBBFAC,,, | Performed by: INTERNAL MEDICINE

## 2024-06-04 PROCEDURE — 99214 OFFICE O/P EST MOD 30 MIN: CPT | Mod: S$GLB,,, | Performed by: INTERNAL MEDICINE

## 2024-06-04 PROCEDURE — 36415 COLL VENOUS BLD VENIPUNCTURE: CPT | Performed by: INTERNAL MEDICINE

## 2024-06-04 PROCEDURE — 3075F SYST BP GE 130 - 139MM HG: CPT | Mod: CPTII,S$GLB,, | Performed by: INTERNAL MEDICINE

## 2024-06-04 PROCEDURE — 3078F DIAST BP <80 MM HG: CPT | Mod: CPTII,S$GLB,, | Performed by: INTERNAL MEDICINE

## 2024-06-04 PROCEDURE — 84443 ASSAY THYROID STIM HORMONE: CPT | Performed by: INTERNAL MEDICINE

## 2024-06-04 PROCEDURE — 93005 ELECTROCARDIOGRAM TRACING: CPT | Mod: S$GLB,,, | Performed by: INTERNAL MEDICINE

## 2024-06-04 PROCEDURE — 1160F RVW MEDS BY RX/DR IN RCRD: CPT | Mod: CPTII,S$GLB,, | Performed by: INTERNAL MEDICINE

## 2024-06-04 PROCEDURE — 1159F MED LIST DOCD IN RCRD: CPT | Mod: CPTII,S$GLB,, | Performed by: INTERNAL MEDICINE

## 2024-06-04 PROCEDURE — 93010 ELECTROCARDIOGRAM REPORT: CPT | Mod: S$GLB,,, | Performed by: INTERNAL MEDICINE

## 2024-06-04 NOTE — PROGRESS NOTES
Subjective:   Patient ID:  Wai Lopez is a 49 y.o. male who presents for evaluation of Atrial Fibrillation    Referring Heart Failure Specialist: Monique Daniel,     HPI  Prior Hx:  I had the pleasure of seeing Mr. Lopez today in our electrophysiology clinic in consultation for his cardiomyopathy. As you are aware he is a pleasant 49 year-old man with chronic ischemic CMP (LVEF 20%, NYHA class II) s/p MI and CABG on 7/14/2021 complicated by tamponade requiring a pericardial window 2 weeks later, AVNRT s/p slow pathway modification in 2017 by Dr. Celestin, post CABG persistent AF, major GI bleed on aspirin/eliquis in 2022 requiring 4u PRBCs (endoscopy revealed no source) and CKD stage 3a. He has been on GDMT since 10/17/2022. LVEF had not recovered. He was on amiodarone previously. PET stress test noted a small fixed defect. He reports he has been in AF for 1.5 years. He reported he had never had attempt at rhythm control with his atrial fibrillation. We started eliquis in April of 2023 with plan for MYLES/DCCV 1 month later then start amiodarone. If able to keep in sinus rhythm would repeat TTE after a few months of sinus rhythm. If EF does not recover with sinus rhythm or we are unable to keep in sinus rhythm even with amiodarone then would proceed with ICD implantation for primary prevention    3/2023 ECHO: LVEF 20%, LVEDD 7.5cm (ECHO 7/2022 from outside hospital: LVEF 15-20%, ECHO in 2/2020 LVEF 35-40%)    Interim Hx:  Mr. Lopez returns for follow-up. We had challenges getting in touch with him to schedule DCCV. Ultimately done 10/2023. He presents for overdue follow-up. Feels well.    My interpretation of today's in clinic ECG is sinus rhythm with a left bundle type IVCD (QRS 154ms)    Review of Systems   Constitutional: Negative for fever and malaise/fatigue.   HENT:  Negative for congestion and sore throat.    Eyes:  Negative for blurred vision and visual disturbance.   Cardiovascular:   Negative for chest pain, dyspnea on exertion, irregular heartbeat, near-syncope, palpitations and syncope.   Respiratory:  Negative for cough and shortness of breath.    Hematologic/Lymphatic: Negative for bleeding problem. Does not bruise/bleed easily.   Skin: Negative.    Musculoskeletal: Negative.    Gastrointestinal:  Negative for bloating, abdominal pain, hematochezia and melena.   Neurological:  Negative for focal weakness and weakness.   Psychiatric/Behavioral: Negative.         Objective:   Physical Exam  Vitals reviewed.   Constitutional:       General: He is not in acute distress.     Appearance: He is well-developed. He is not diaphoretic.   HENT:      Head: Normocephalic and atraumatic.   Eyes:      General:         Right eye: No discharge.         Left eye: No discharge.      Conjunctiva/sclera: Conjunctivae normal.   Cardiovascular:      Rate and Rhythm: Normal rate and regular rhythm.      Heart sounds: No murmur heard.     No friction rub. No gallop.   Pulmonary:      Effort: Pulmonary effort is normal. No respiratory distress.      Breath sounds: Normal breath sounds. No wheezing or rales.   Abdominal:      General: Bowel sounds are normal. There is no distension.      Palpations: Abdomen is soft.      Tenderness: There is no abdominal tenderness.   Musculoskeletal:      Cervical back: Neck supple.   Skin:     General: Skin is warm and dry.   Neurological:      Mental Status: He is alert and oriented to person, place, and time.   Psychiatric:         Behavior: Behavior normal.         Thought Content: Thought content normal.         Judgment: Judgment normal.         Assessment:      1. Ischemic cardiomyopathy    2. Persistent atrial fibrillation    3. Stage 3a chronic kidney disease        Plan:     In summary, Mr. Lopez is a pleasant 49 year-old man with chronic ischemic CMP (LVEF 20%, NYHA class II) s/p MI and CABG on 7/14/2021 complicated by tamponade requiring a pericardial window 2 weeks  later, AVNRT s/p slow pathway modification in 2017 by Dr. Celestin, post CABG long-standing persistent AF, major GI bleed on aspirin/eliquis in 2022 requiring 4u PRBCs and CKD stage 3a. Looking at his case I notice he has been left in AF without any attempt for rhythm control. The nearest ECHO to before he went into AF that I have available was from 2020 and his LV function was better. Discussed pathophysiology of AF and possibility that persistent AF could have negatively impacted his LV function. If so it is possible he could have some LV functional recovery with sinus rhythm. However we discussed this would be challenging due to being in persistent AF for over 1 year. Discussed stroke risk with AF ([id number] score 2) and need for anticoagulation. He is now s/p MYLES/DCCV and has maintained sinus rhythm on amiodarone.    Plan  ECHO  If LVEF improved to >35% would discuss PVI  If still low discuss CRT-D  Needs updated CMP, TSH, CBC  Needs follow-up with HTS    Thank you for allowing me to participate in the care of this patient. Please do not hesitate to call me with any questions or concerns.    Rob Galan MD, PhD  Cardiac Electrophysiology

## 2024-09-18 ENCOUNTER — TELEPHONE (OUTPATIENT)
Dept: ELECTROPHYSIOLOGY | Facility: CLINIC | Age: 49
End: 2024-09-18
Payer: MEDICARE

## 2024-10-16 ENCOUNTER — HOSPITAL ENCOUNTER (OUTPATIENT)
Dept: CARDIOLOGY | Facility: HOSPITAL | Age: 49
Discharge: HOME | End: 2024-10-16
Attending: INTERNAL MEDICINE
Payer: MEDICARE

## 2024-10-16 VITALS
BODY MASS INDEX: 34.44 KG/M2 | WEIGHT: 246 LBS | HEART RATE: 70 BPM | DIASTOLIC BLOOD PRESSURE: 77 MMHG | HEIGHT: 71 IN | SYSTOLIC BLOOD PRESSURE: 130 MMHG

## 2024-10-16 DIAGNOSIS — I25.5 ISCHEMIC CARDIOMYOPATHY: ICD-10-CM

## 2024-10-16 DIAGNOSIS — I48.0 PAROXYSMAL ATRIAL FIBRILLATION: Primary | ICD-10-CM

## 2024-10-16 LAB
ASCENDING AORTA: 3.97 CM
AV AREA BY CONTINUOUS VTI: 5.7 CM2
AV INDEX (PROSTH): 0.81
AV LVOT MEAN GRADIENT: 2 MMHG
AV LVOT PEAK GRADIENT: 4 MMHG
AV MEAN GRADIENT: 2.8 MMHG
AV PEAK GRADIENT: 4.8 MMHG
AV VALVE AREA BY VELOCITY RATIO: 6.4 CM²
AV VALVE AREA: 5.7 CM2
AV VELOCITY RATIO: 0.91
BSA FOR ECHO PROCEDURE: 2.36 M2
CV ECHO LV RWT: 0.29 CM
DOP CALC AO PEAK VEL: 1.1 M/S
DOP CALC AO VTI: 22.9 CM
DOP CALC LVOT AREA: 7.1 CM2
DOP CALC LVOT DIAMETER: 3 CM
DOP CALC LVOT PEAK VEL: 1 M/S
DOP CALC LVOT STROKE VOLUME: 131.4 CM3
DOP CALCLVOT PEAK VEL VTI: 18.6 CM
E WAVE DECELERATION TIME: 144.49 MS
E/A RATIO: 1.14
E/E' RATIO: 9.5 M/S
ECHO EF ESTIMATED: 35 %
ECHO LV POSTERIOR WALL: 1.1 CM (ref 0.6–1.1)
FRACTIONAL SHORTENING: 14.5 % (ref 28–44)
INTERVENTRICULAR SEPTUM: 1.2 CM (ref 0.6–1.1)
IVC DIAMETER: 1.56 CM
IVRT: 129.4 MS
LA MAJOR: 6.72 CM
LA MINOR: 6.77 CM
LA WIDTH: 4.99 CM
LEFT ATRIUM SIZE: 5.85 CM
LEFT ATRIUM VOLUME INDEX MOD: 65.6 ML/M2
LEFT ATRIUM VOLUME INDEX: 72.8 ML/M2
LEFT ATRIUM VOLUME MOD: 150.84 ML
LEFT ATRIUM VOLUME: 167.36 CM3
LEFT INTERNAL DIMENSION IN SYSTOLE: 6.5 CM (ref 2.1–4)
LEFT VENTRICLE DIASTOLIC VOLUME INDEX: 107.47 ML/M2
LEFT VENTRICLE DIASTOLIC VOLUME: 247.17 ML
LEFT VENTRICLE MASS INDEX: 192.5 G/M2
LEFT VENTRICLE SYSTOLIC VOLUME INDEX: 69.6 ML/M2
LEFT VENTRICLE SYSTOLIC VOLUME: 160.01 ML
LEFT VENTRICULAR INTERNAL DIMENSION IN DIASTOLE: 7.6 CM (ref 3.5–6)
LEFT VENTRICULAR MASS: 442.7 G
LV LATERAL E/E' RATIO: 9.5
LV SEPTAL E/E' RATIO: 9.5
MV A" WAVE DURATION": 277.83 MS
MV PEAK A VEL: 0.5 M/S
MV PEAK E VEL: 0.57 M/S
OHS CV RV/LV RATIO: 0.55 CM
PISA TR MAX VEL: 2.13 M/S
PULM VEIN A" WAVE DURATION": 277.83 MS
PULM VEIN S/D RATIO: 0.77
PULMONIC VEIN PEAK A VELOCITY: 0.3 M/S
PV PEAK D VEL: 0.26 M/S
PV PEAK S VEL: 0.2 M/S
RA MAJOR: 5.88 CM
RA PRESSURE ESTIMATED: 3 MMHG
RA WIDTH: 4 CM
RIGHT ATRIAL AREA: 20.7 CM2
RIGHT VENTRICLE DIASTOLIC BASEL DIMENSION: 4.2 CM
RV TB RVSP: 5 MMHG
RV TISSUE DOPPLER FREE WALL SYSTOLIC VELOCITY 1 (APICAL 4 CHAMBER VIEW): 8.69 CM/S
SINUS: 4.43 CM
STJ: 3.75 CM
TDI LATERAL: 0.06 M/S
TDI SEPTAL: 0.06 M/S
TDI: 0.06 M/S
TR MAX PG: 18 MMHG
TRICUSPID ANNULAR PLANE SYSTOLIC EXCURSION: 2.01 CM
TV PEAK GRADIENT: 18 MMHG
TV REST PULMONARY ARTERY PRESSURE: 21 MMHG
Z-SCORE OF LEFT VENTRICULAR DIMENSION IN END DIASTOLE: -1.59
Z-SCORE OF LEFT VENTRICULAR DIMENSION IN END SYSTOLE: 1.5

## 2024-10-16 PROCEDURE — 25500020 PHARM REV CODE 255: Performed by: INTERNAL MEDICINE

## 2024-10-16 PROCEDURE — 93306 TTE W/DOPPLER COMPLETE: CPT | Mod: 26,,, | Performed by: INTERNAL MEDICINE

## 2024-10-16 PROCEDURE — C8929 TTE W OR WO FOL WCON,DOPPLER: HCPCS

## 2024-10-16 RX ADMIN — PERFLUTREN 1.5 ML: 6.52 INJECTION, SUSPENSION INTRAVENOUS at 09:10

## 2024-10-16 NOTE — NURSING NOTE
Called into pt's room to start PIV for Definity admin per Avila RT. Pt identified by 2 identifiers. Allergies reviewed. 22g PIV placed to lt hand x1. Site intact w/o complications and WNL; +BR noted, flushed w/ 10cc NS and secured w/ tegaderm. Definity subsequently admin'd per Avila RT. Pt tolerated procedure well. IV d/c with catheter intact, pressure dsg applied.

## 2024-10-17 ENCOUNTER — TELEPHONE (OUTPATIENT)
Dept: ELECTROPHYSIOLOGY | Facility: CLINIC | Age: 49
End: 2024-10-17
Payer: MEDICARE

## 2024-10-17 DIAGNOSIS — I50.42 CHRONIC COMBINED SYSTOLIC AND DIASTOLIC HEART FAILURE: ICD-10-CM

## 2024-10-17 DIAGNOSIS — I25.5 ISCHEMIC CARDIOMYOPATHY: ICD-10-CM

## 2024-10-17 RX ORDER — CARVEDILOL 25 MG/1
25 TABLET ORAL 2 TIMES DAILY WITH MEALS
Qty: 90 TABLET | Refills: 11 | Status: SHIPPED | OUTPATIENT
Start: 2024-10-17 | End: 2026-04-10

## 2024-10-17 RX ORDER — BECLOMETHASONE DIPROPIONATE HFA 40 UG/1
80 AEROSOL, METERED RESPIRATORY (INHALATION) 2 TIMES DAILY
Qty: 10.6 G | Refills: 0 | Status: SHIPPED | OUTPATIENT
Start: 2024-10-17

## 2024-10-17 RX ORDER — ATORVASTATIN CALCIUM 40 MG/1
40 TABLET, FILM COATED ORAL DAILY
Qty: 60 TABLET | Refills: 0 | Status: SHIPPED | OUTPATIENT
Start: 2024-10-17

## 2024-10-17 RX ORDER — SPIRONOLACTONE 25 MG/1
25 TABLET ORAL DAILY
Qty: 90 TABLET | Refills: 11 | Status: SHIPPED | OUTPATIENT
Start: 2024-10-17 | End: 2027-10-02

## 2024-10-17 RX ORDER — SACUBITRIL AND VALSARTAN 97; 103 MG/1; MG/1
1 TABLET, FILM COATED ORAL 2 TIMES DAILY
Qty: 120 TABLET | Refills: 8 | Status: SHIPPED | OUTPATIENT
Start: 2024-10-17

## 2024-10-17 RX ORDER — ESCITALOPRAM OXALATE 20 MG/1
20 TABLET ORAL DAILY
Qty: 30 TABLET | Refills: 11 | Status: SHIPPED | OUTPATIENT
Start: 2024-10-17

## 2024-10-17 RX ORDER — FUROSEMIDE 40 MG/1
40 TABLET ORAL
Qty: 30 TABLET | Refills: 6
Start: 2024-10-17

## 2024-10-17 RX ORDER — ISOSORBIDE MONONITRATE 30 MG/1
60 TABLET, EXTENDED RELEASE ORAL DAILY
Qty: 90 TABLET | Refills: 3 | Status: SHIPPED | OUTPATIENT
Start: 2024-10-17

## 2024-10-17 RX ORDER — DAPAGLIFLOZIN 10 MG/1
10 TABLET, FILM COATED ORAL DAILY
Qty: 30 TABLET | Refills: 11 | Status: SHIPPED | OUTPATIENT
Start: 2024-10-17

## 2024-10-17 RX ORDER — FERROUS SULFATE 325(65) MG
325 TABLET, DELAYED RELEASE (ENTERIC COATED) ORAL DAILY
Qty: 30 TABLET | Refills: 11 | Status: SHIPPED | OUTPATIENT
Start: 2024-10-17

## 2024-10-17 RX ORDER — HYDRALAZINE HYDROCHLORIDE 25 MG/1
25 TABLET, FILM COATED ORAL 2 TIMES DAILY
Qty: 60 TABLET | Refills: 11 | Status: SHIPPED | OUTPATIENT
Start: 2024-10-17 | End: 2025-10-17

## 2024-10-17 NOTE — TELEPHONE ENCOUNTER
Spoke with patient regarding his ECHO showing EF remains <35%. He has class II symptoms with wide LBBB and his heart failure and low EF persist despite >90 days of GDMT. He is a CRT-D candidate as discussed in clinic. He elects to proceed.    Plan  CRT-D  [employer]  Anesthesia  Hold eliquis 48 hrs prior  Hold Entresto AM of procedure

## 2024-10-18 ENCOUNTER — TELEPHONE (OUTPATIENT)
Dept: ELECTROPHYSIOLOGY | Facility: CLINIC | Age: 49
End: 2024-10-18
Payer: MEDICARE

## 2024-10-22 DIAGNOSIS — I50.42 CHRONIC COMBINED SYSTOLIC AND DIASTOLIC HEART FAILURE: Primary | ICD-10-CM

## 2024-10-22 DIAGNOSIS — I44.7 LEFT BUNDLE BRANCH BLOCK (LBBB): ICD-10-CM

## 2024-11-12 ENCOUNTER — TELEPHONE (OUTPATIENT)
Dept: TRANSPLANT | Facility: CLINIC | Age: 49
End: 2024-11-12
Payer: MEDICARE

## 2024-11-12 ENCOUNTER — PATIENT MESSAGE (OUTPATIENT)
Dept: ELECTROPHYSIOLOGY | Facility: CLINIC | Age: 49
End: 2024-11-12
Payer: MEDICARE

## 2024-11-14 ENCOUNTER — LAB VISIT (OUTPATIENT)
Dept: LAB | Facility: HOSPITAL | Age: 49
End: 2024-11-14
Payer: MEDICARE

## 2024-11-14 ENCOUNTER — OFFICE VISIT (OUTPATIENT)
Dept: TRANSPLANT | Facility: CLINIC | Age: 49
End: 2024-11-14
Payer: MEDICARE

## 2024-11-14 VITALS
DIASTOLIC BLOOD PRESSURE: 72 MMHG | BODY MASS INDEX: 34.81 KG/M2 | HEART RATE: 56 BPM | WEIGHT: 248.69 LBS | SYSTOLIC BLOOD PRESSURE: 130 MMHG | HEIGHT: 71 IN

## 2024-11-14 DIAGNOSIS — N18.31 STAGE 3A CHRONIC KIDNEY DISEASE: ICD-10-CM

## 2024-11-14 DIAGNOSIS — Z86.39 HISTORY OF DIABETES MELLITUS: ICD-10-CM

## 2024-11-14 DIAGNOSIS — I50.42 CHRONIC COMBINED SYSTOLIC AND DIASTOLIC HEART FAILURE: ICD-10-CM

## 2024-11-14 DIAGNOSIS — I25.118 CORONARY ARTERY DISEASE OF NATIVE ARTERY OF NATIVE HEART WITH STABLE ANGINA PECTORIS: ICD-10-CM

## 2024-11-14 DIAGNOSIS — Z98.890 STATUS POST CREATION OF PERICARDIAL WINDOW: ICD-10-CM

## 2024-11-14 DIAGNOSIS — I25.5 ISCHEMIC CARDIOMYOPATHY: ICD-10-CM

## 2024-11-14 DIAGNOSIS — Z95.1 HX OF CABG: ICD-10-CM

## 2024-11-14 DIAGNOSIS — I48.19 PERSISTENT ATRIAL FIBRILLATION: ICD-10-CM

## 2024-11-14 DIAGNOSIS — F17.201 TOBACCO ABUSE, IN REMISSION: ICD-10-CM

## 2024-11-14 DIAGNOSIS — F19.21 POLYSUBSTANCE DEPENDENCE, NON-OPIOID, IN REMISSION: Primary | ICD-10-CM

## 2024-11-14 LAB
ALBUMIN SERPL BCP-MCNC: 3.4 G/DL (ref 3.5–5.2)
ALP SERPL-CCNC: 88 U/L (ref 40–150)
ALT SERPL W/O P-5'-P-CCNC: 12 U/L (ref 10–44)
ANION GAP SERPL CALC-SCNC: 9 MMOL/L (ref 8–16)
ANISOCYTOSIS BLD QL SMEAR: SLIGHT
AST SERPL-CCNC: 13 U/L (ref 10–40)
BASOPHILS # BLD AUTO: 0.09 K/UL (ref 0–0.2)
BASOPHILS NFR BLD: 1.1 % (ref 0–1.9)
BILIRUB SERPL-MCNC: 0.8 MG/DL (ref 0.1–1)
BNP SERPL-MCNC: 702 PG/ML (ref 0–99)
BUN SERPL-MCNC: 26 MG/DL (ref 6–20)
CALCIUM SERPL-MCNC: 8.6 MG/DL (ref 8.7–10.5)
CHLORIDE SERPL-SCNC: 109 MMOL/L (ref 95–110)
CO2 SERPL-SCNC: 19 MMOL/L (ref 23–29)
CREAT SERPL-MCNC: 2.1 MG/DL (ref 0.5–1.4)
DIFFERENTIAL METHOD BLD: ABNORMAL
EOSINOPHIL # BLD AUTO: 0.3 K/UL (ref 0–0.5)
EOSINOPHIL NFR BLD: 3.1 % (ref 0–8)
ERYTHROCYTE [DISTWIDTH] IN BLOOD BY AUTOMATED COUNT: 15.3 % (ref 11.5–14.5)
EST. GFR  (NO RACE VARIABLE): 37.6 ML/MIN/1.73 M^2
GLUCOSE SERPL-MCNC: 99 MG/DL (ref 70–110)
HCT VFR BLD AUTO: 46 % (ref 40–54)
HGB BLD-MCNC: 14.9 G/DL (ref 14–18)
HYPOCHROMIA BLD QL SMEAR: ABNORMAL
IMM GRANULOCYTES # BLD AUTO: 0.05 K/UL (ref 0–0.04)
IMM GRANULOCYTES NFR BLD AUTO: 0.6 % (ref 0–0.5)
LYMPHOCYTES # BLD AUTO: 1.5 K/UL (ref 1–4.8)
LYMPHOCYTES NFR BLD: 18.1 % (ref 18–48)
MAGNESIUM SERPL-MCNC: 2.1 MG/DL (ref 1.6–2.6)
MCH RBC QN AUTO: 26.9 PG (ref 27–31)
MCHC RBC AUTO-ENTMCNC: 32.4 G/DL (ref 32–36)
MCV RBC AUTO: 83 FL (ref 82–98)
MONOCYTES # BLD AUTO: 0.9 K/UL (ref 0.3–1)
MONOCYTES NFR BLD: 11.1 % (ref 4–15)
NEUTROPHILS # BLD AUTO: 5.5 K/UL (ref 1.8–7.7)
NEUTROPHILS NFR BLD: 66 % (ref 38–73)
NRBC BLD-RTO: 0 /100 WBC
OVALOCYTES BLD QL SMEAR: ABNORMAL
PLATELET # BLD AUTO: 168 K/UL (ref 150–450)
PMV BLD AUTO: ABNORMAL FL (ref 9.2–12.9)
POIKILOCYTOSIS BLD QL SMEAR: SLIGHT
POLYCHROMASIA BLD QL SMEAR: ABNORMAL
POTASSIUM SERPL-SCNC: 4.1 MMOL/L (ref 3.5–5.1)
PROT SERPL-MCNC: 6.2 G/DL (ref 6–8.4)
RBC # BLD AUTO: 5.53 M/UL (ref 4.6–6.2)
SODIUM SERPL-SCNC: 137 MMOL/L (ref 136–145)
SPHEROCYTES BLD QL SMEAR: ABNORMAL
WBC # BLD AUTO: 8.29 K/UL (ref 3.9–12.7)

## 2024-11-14 PROCEDURE — 85025 COMPLETE CBC W/AUTO DIFF WBC: CPT | Performed by: INTERNAL MEDICINE

## 2024-11-14 PROCEDURE — 36415 COLL VENOUS BLD VENIPUNCTURE: CPT | Performed by: INTERNAL MEDICINE

## 2024-11-14 PROCEDURE — 83735 ASSAY OF MAGNESIUM: CPT | Performed by: INTERNAL MEDICINE

## 2024-11-14 PROCEDURE — 83880 ASSAY OF NATRIURETIC PEPTIDE: CPT | Performed by: INTERNAL MEDICINE

## 2024-11-14 PROCEDURE — 99999 PR PBB SHADOW E&M-EST. PATIENT-LVL III: CPT | Mod: PBBFAC,,, | Performed by: INTERNAL MEDICINE

## 2024-11-14 PROCEDURE — 80053 COMPREHEN METABOLIC PANEL: CPT | Performed by: INTERNAL MEDICINE

## 2024-11-14 RX ORDER — ESCITALOPRAM OXALATE 20 MG/1
20 TABLET ORAL DAILY
Qty: 30 TABLET | Refills: 11 | Status: SHIPPED | OUTPATIENT
Start: 2024-11-14

## 2024-11-14 RX ORDER — RANOLAZINE 500 MG/1
500 TABLET, EXTENDED RELEASE ORAL 2 TIMES DAILY
Qty: 60 TABLET | Refills: 11 | Status: SHIPPED | OUTPATIENT
Start: 2024-11-14 | End: 2025-11-14

## 2024-11-14 RX ORDER — FERROUS SULFATE 325(65) MG
325 TABLET, DELAYED RELEASE (ENTERIC COATED) ORAL DAILY
Qty: 30 TABLET | Refills: 11 | Status: SHIPPED | OUTPATIENT
Start: 2024-11-14

## 2024-11-14 RX ORDER — CARVEDILOL 25 MG/1
25 TABLET ORAL 2 TIMES DAILY WITH MEALS
Qty: 90 TABLET | Refills: 11 | Status: SHIPPED | OUTPATIENT
Start: 2024-11-14 | End: 2026-05-08

## 2024-11-14 RX ORDER — ISOSORBIDE MONONITRATE 30 MG/1
60 TABLET, EXTENDED RELEASE ORAL DAILY
Qty: 90 TABLET | Refills: 3 | Status: SHIPPED | OUTPATIENT
Start: 2024-11-14

## 2024-11-14 RX ORDER — ATORVASTATIN CALCIUM 40 MG/1
40 TABLET, FILM COATED ORAL DAILY
Qty: 60 TABLET | Refills: 0 | Status: SHIPPED | OUTPATIENT
Start: 2024-11-14

## 2024-11-14 RX ORDER — FUROSEMIDE 40 MG/1
40 TABLET ORAL
Start: 2024-11-14

## 2024-11-14 RX ORDER — SPIRONOLACTONE 25 MG/1
25 TABLET ORAL DAILY
Qty: 90 TABLET | Refills: 11 | Status: SHIPPED | OUTPATIENT
Start: 2024-11-14 | End: 2027-10-30

## 2024-11-14 RX ORDER — DAPAGLIFLOZIN 10 MG/1
10 TABLET, FILM COATED ORAL DAILY
Qty: 30 TABLET | Refills: 11 | Status: SHIPPED | OUTPATIENT
Start: 2024-11-14

## 2024-11-14 RX ORDER — NITROGLYCERIN 0.4 MG/1
0.4 TABLET SUBLINGUAL EVERY 5 MIN PRN
Qty: 60 TABLET | Refills: 12 | Status: SHIPPED | OUTPATIENT
Start: 2024-11-14 | End: 2025-11-14

## 2024-11-14 RX ORDER — HYDRALAZINE HYDROCHLORIDE 25 MG/1
25 TABLET, FILM COATED ORAL 2 TIMES DAILY
Qty: 60 TABLET | Refills: 11 | Status: SHIPPED | OUTPATIENT
Start: 2024-11-14 | End: 2025-11-14

## 2024-11-14 RX ORDER — SACUBITRIL AND VALSARTAN 97; 103 MG/1; MG/1
1 TABLET, FILM COATED ORAL 2 TIMES DAILY
Qty: 120 TABLET | Refills: 8 | Status: SHIPPED | OUTPATIENT
Start: 2024-11-14
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
## 2024-12-11 ENCOUNTER — PATIENT MESSAGE (OUTPATIENT)
Dept: ELECTROPHYSIOLOGY | Facility: CLINIC | Age: 49
End: 2024-12-11
Payer: MEDICARE

## 2024-12-16 ENCOUNTER — TELEPHONE (OUTPATIENT)
Dept: ELECTROPHYSIOLOGY | Facility: CLINIC | Age: 49
End: 2024-12-16
Payer: MEDICARE

## 2024-12-16 NOTE — TELEPHONE ENCOUNTER
Left message asking patient to return call to go over instructions for CRT-D insertion scheduled on 12/18/24.

## 2024-12-17 ENCOUNTER — PATIENT MESSAGE (OUTPATIENT)
Dept: ELECTROPHYSIOLOGY | Facility: CLINIC | Age: 49
End: 2024-12-17
Payer: MEDICARE

## 2024-12-17 ENCOUNTER — TELEPHONE (OUTPATIENT)
Dept: ELECTROPHYSIOLOGY | Facility: CLINIC | Age: 49
End: 2024-12-17
Payer: MEDICARE

## 2024-12-17 ENCOUNTER — ANESTHESIA EVENT (OUTPATIENT)
Dept: MEDSURG UNIT | Facility: HOSPITAL | Age: 49
End: 2024-12-17
Payer: MEDICARE

## 2024-12-17 NOTE — TELEPHONE ENCOUNTER
Spoke to patient    CONFIRMED procedure arrival time of 5:30 am     Reiterated instructions including:     -Directions to check in desk     -NPO after midnight night prior to procedure. Fasting upon arrival to the hospital the day of the procedure. Patient allowed to drink water up until 2 hours prior to arrival due to IV fluid shortage.      -High importance of HOLDING Eliquis 2 days prior to procedure (last dose on 12/15/24); holding Farxiga, Entresto, Lasix, Hydralazine, and Spironolactone on day of procedure (last dose on 12/17/24)     - Confirms no new meds prescribed or med changes since scheduling -      -Pre-procedure LABS Reviewed, no alerts noted     -Confirmed absence or presence of implanted device/stimulator n/a     -Confirmed no recent or current fever, cough, or shortness of breath .     -Confirmed no redness, rash, irritation, or yeast infection to skin/ chest area.      -Bathe night prior and morning prior to procedure with Hibiclens solution or an antibacterial soap    Patient verbalized understanding of above, denies any further questions and appreciated the call.

## 2024-12-17 NOTE — TELEPHONE ENCOUNTER
Left message for pt with the following:    CONFIRMED procedure arrival time of 5:30 am    Reiterated instructions including:    -Directions to check in desk    -NPO after midnight night prior to procedure. Fasting upon arrival to the hospital the day of the procedure. Patient allowed to drink water up until 2 hours prior to arrival due to IV fluid shortage.     -High importance of HOLDING Eliquis 2 days prior to procedure (last dose on 12/15/24); holding Farxiga, Entresto, Lasix, Hydralazine, and Spironolactone on day of procedure (last dose on 12/17/24)    - Confirms no new meds prescribed or med changes since scheduling -     -Pre-procedure LABS Reviewed, no alerts noted    -Confirmed absence or presence of implanted device/stimulator n/a    -Confirmed no recent or current fever, cough, or shortness of breath .    -Confirmed no redness, rash, irritation, or yeast infection to skin/ chest area.     -Bathe night prior and morning prior to procedure with Hibiclens solution or an antibacterial soap    Advised if he didn't hold Eliquis as directed, any skin issues, or any medication changes, please call.

## 2024-12-17 NOTE — ANESTHESIA PREPROCEDURE EVALUATION
12/17/2024  Wai Lopez is a 49 y.o., male.      Pre-op Assessment    I have reviewed the Patient Summary Reports.     I have reviewed the Nursing Notes. I have reviewed the NPO Status.   I have reviewed the Medications.     Review of Systems  Anesthesia Hx:  No problems with previous Anesthesia   History of prior surgery of interest to airway management or planning:          Denies Family Hx of Anesthesia complications.    Denies Personal Hx of Anesthesia complications.                    Hematology/Oncology:  Hematology Normal   Oncology Normal                                   EENT/Dental:  EENT/Dental Normal           Cardiovascular:  Exercise tolerance: good      CAD   CABG/stent    CHF       ECG has been reviewed.                            Pulmonary:  Pulmonary Normal                       Renal/:  Chronic Renal Disease, CKD                Hepatic/GI:  Hepatic/GI Normal                    Musculoskeletal:  Musculoskeletal Normal                Neurological:  Neurology Normal                                      Endocrine:  Endocrine Normal          Obesity / BMI > 30  Dermatological:  Skin Normal    Psych:  Psychiatric Normal                    Physical Exam  General: Well nourished, Cooperative, Alert and Oriented    Airway:  Mallampati: II   Mouth Opening: Normal  TM Distance: Normal  Tongue: Normal  Neck ROM: Normal ROM    Dental:  Intact        Anesthesia Plan  Type of Anesthesia, risks & benefits discussed:    Anesthesia Type: Gen Natural Airway  Intra-op Monitoring Plan: Standard ASA Monitors  Post Op Pain Control Plan: IV/PO Opioids PRN and multimodal analgesia  Induction:  IV  Informed Consent: Informed consent signed with the Patient and all parties understand the risks and agree with anesthesia plan.  All questions answered.   ASA Score: 3  Day of Surgery Review of History &  Physical: H&P Update referred to the surgeon/provider.    Ready For Surgery From Anesthesia Perspective.     .

## 2024-12-18 ENCOUNTER — HOSPITAL ENCOUNTER (OUTPATIENT)
Facility: HOSPITAL | Age: 49
Discharge: HOME | End: 2024-12-18
Attending: INTERNAL MEDICINE | Admitting: INTERNAL MEDICINE
Payer: MEDICARE

## 2024-12-18 ENCOUNTER — ANESTHESIA (OUTPATIENT)
Dept: MEDSURG UNIT | Facility: HOSPITAL | Age: 49
End: 2024-12-18
Payer: MEDICARE

## 2024-12-18 VITALS
SYSTOLIC BLOOD PRESSURE: 137 MMHG | RESPIRATION RATE: 16 BRPM | OXYGEN SATURATION: 98 % | WEIGHT: 252 LBS | HEART RATE: 61 BPM | BODY MASS INDEX: 35.28 KG/M2 | TEMPERATURE: 98 F | DIASTOLIC BLOOD PRESSURE: 88 MMHG | HEIGHT: 71 IN

## 2024-12-18 DIAGNOSIS — Z95.9 CARDIAC DEVICE IN SITU: ICD-10-CM

## 2024-12-18 DIAGNOSIS — I50.42 CHRONIC COMBINED SYSTOLIC AND DIASTOLIC CONGESTIVE HEART FAILURE: ICD-10-CM

## 2024-12-18 DIAGNOSIS — I49.9 ARRHYTHMIA: ICD-10-CM

## 2024-12-18 DIAGNOSIS — I44.7 LEFT BUNDLE BRANCH BLOCK (LBBB): ICD-10-CM

## 2024-12-18 LAB
OHS QRS DURATION: 134 MS
OHS QRS DURATION: 142 MS
OHS QTC CALCULATION: 459 MS
OHS QTC CALCULATION: 462 MS

## 2024-12-18 PROCEDURE — 25000003 PHARM REV CODE 250: Performed by: REGISTERED NURSE

## 2024-12-18 PROCEDURE — 93005 ELECTROCARDIOGRAM TRACING: CPT

## 2024-12-18 PROCEDURE — 63600175 PHARM REV CODE 636 W HCPCS: Performed by: REGISTERED NURSE

## 2024-12-18 PROCEDURE — C1725 CATH, TRANSLUMIN NON-LASER: HCPCS | Performed by: INTERNAL MEDICINE

## 2024-12-18 PROCEDURE — 37000009 HC ANESTHESIA EA ADD 15 MINS: Performed by: INTERNAL MEDICINE

## 2024-12-18 PROCEDURE — C1894 INTRO/SHEATH, NON-LASER: HCPCS | Performed by: INTERNAL MEDICINE

## 2024-12-18 PROCEDURE — C1769 GUIDE WIRE: HCPCS | Performed by: INTERNAL MEDICINE

## 2024-12-18 PROCEDURE — 93010 ELECTROCARDIOGRAM REPORT: CPT | Mod: ,,, | Performed by: INTERNAL MEDICINE

## 2024-12-18 PROCEDURE — 25000003 PHARM REV CODE 250: Performed by: INTERNAL MEDICINE

## 2024-12-18 PROCEDURE — C1777 LEAD, AICD, ENDO SINGLE COIL: HCPCS | Performed by: INTERNAL MEDICINE

## 2024-12-18 PROCEDURE — 63600175 PHARM REV CODE 636 W HCPCS: Mod: JG | Performed by: REGISTERED NURSE

## 2024-12-18 PROCEDURE — 37000008 HC ANESTHESIA 1ST 15 MINUTES: Performed by: INTERNAL MEDICINE

## 2024-12-18 PROCEDURE — 25500020 PHARM REV CODE 255: Performed by: REGISTERED NURSE

## 2024-12-18 PROCEDURE — C1898 LEAD, PMKR, OTHER THAN TRANS: HCPCS | Performed by: INTERNAL MEDICINE

## 2024-12-18 PROCEDURE — 25000003 PHARM REV CODE 250: Performed by: STUDENT IN AN ORGANIZED HEALTH CARE EDUCATION/TRAINING PROGRAM

## 2024-12-18 PROCEDURE — C1887 CATHETER, GUIDING: HCPCS | Performed by: INTERNAL MEDICINE

## 2024-12-18 PROCEDURE — C1721 AICD, DUAL CHAMBER: HCPCS | Performed by: INTERNAL MEDICINE

## 2024-12-18 PROCEDURE — 33249 INSJ/RPLCMT DEFIB W/LEAD(S): CPT | Mod: 22,,, | Performed by: INTERNAL MEDICINE

## 2024-12-18 PROCEDURE — 63600175 PHARM REV CODE 636 W HCPCS: Performed by: INTERNAL MEDICINE

## 2024-12-18 PROCEDURE — 27800903 OPTIME MED/SURG SUP & DEVICES OTHER IMPLANTS: Performed by: INTERNAL MEDICINE

## 2024-12-18 PROCEDURE — 33249 INSJ/RPLCMT DEFIB W/LEAD(S): CPT | Performed by: INTERNAL MEDICINE

## 2024-12-18 PROCEDURE — C1900 LEAD, CORONARY VENOUS: HCPCS | Performed by: INTERNAL MEDICINE

## 2024-12-18 DEVICE — ICD DDPB3D4 COBALT DR MRI DF4 USA
Type: IMPLANTABLE DEVICE | Site: CHEST | Status: FUNCTIONAL
Brand: COBALT™ DR MRI SURESCAN™

## 2024-12-18 DEVICE — LEAD 6935M62 QUATTRO SECURE S MRI US
Type: IMPLANTABLE DEVICE | Site: HEART | Status: FUNCTIONAL
Brand: SPRINT QUATTRO SECURE S MRI™ SURESCAN™

## 2024-12-18 DEVICE — LEAD 5076-52 MRI US RCMCRD
Type: IMPLANTABLE DEVICE | Site: HEART | Status: FUNCTIONAL
Brand: CAPSUREFIX NOVUS MRI™ SURESCAN®

## 2024-12-18 DEVICE — ENVELOPE CMRM6133 ABSORB LRG MR
Type: IMPLANTABLE DEVICE | Site: CHEST | Status: FUNCTIONAL
Brand: TYRX™

## 2024-12-18 RX ORDER — VANCOMYCIN HYDROCHLORIDE 1 G/20ML
INJECTION, POWDER, LYOPHILIZED, FOR SOLUTION INTRAVENOUS
Status: DISCONTINUED | OUTPATIENT
Start: 2024-12-18 | End: 2024-12-18 | Stop reason: HOSPADM

## 2024-12-18 RX ORDER — ACETAMINOPHEN 325 MG/1
650 TABLET ORAL EVERY 4 HOURS PRN
Status: DISCONTINUED | OUTPATIENT
Start: 2024-12-18 | End: 2024-12-18 | Stop reason: HOSPADM

## 2024-12-18 RX ORDER — SODIUM CHLORIDE 0.9 % (FLUSH) 0.9 %
3 SYRINGE (ML) INJECTION
Status: DISCONTINUED | OUTPATIENT
Start: 2024-12-18 | End: 2024-12-18 | Stop reason: HOSPADM

## 2024-12-18 RX ORDER — DEXMEDETOMIDINE HYDROCHLORIDE 100 UG/ML
INJECTION, SOLUTION INTRAVENOUS
Status: DISCONTINUED | OUTPATIENT
Start: 2024-12-18 | End: 2024-12-18

## 2024-12-18 RX ORDER — FENTANYL CITRATE 50 UG/ML
INJECTION, SOLUTION INTRAMUSCULAR; INTRAVENOUS
Status: DISCONTINUED | OUTPATIENT
Start: 2024-12-18 | End: 2024-12-18

## 2024-12-18 RX ORDER — IODIXANOL 320 MG/ML
INJECTION, SOLUTION INTRAVASCULAR
Status: DISCONTINUED | OUTPATIENT
Start: 2024-12-18 | End: 2024-12-18

## 2024-12-18 RX ORDER — GLUCAGON 1 MG
1 KIT INJECTION
Status: DISCONTINUED | OUTPATIENT
Start: 2024-12-18 | End: 2024-12-18 | Stop reason: HOSPADM

## 2024-12-18 RX ORDER — PROPOFOL 10 MG/ML
VIAL (ML) INTRAVENOUS
Status: DISCONTINUED | OUTPATIENT
Start: 2024-12-18 | End: 2024-12-18

## 2024-12-18 RX ORDER — FENTANYL CITRATE 50 UG/ML
25 INJECTION, SOLUTION INTRAMUSCULAR; INTRAVENOUS EVERY 5 MIN PRN
Status: DISCONTINUED | OUTPATIENT
Start: 2024-12-18 | End: 2024-12-18 | Stop reason: HOSPADM

## 2024-12-18 RX ORDER — LIDOCAINE HYDROCHLORIDE 20 MG/ML
INJECTION INTRAVENOUS
Status: DISCONTINUED | OUTPATIENT
Start: 2024-12-18 | End: 2024-12-18

## 2024-12-18 RX ORDER — VASOPRESSIN 20 [USP'U]/ML
INJECTION, SOLUTION INTRAMUSCULAR; SUBCUTANEOUS
Status: DISCONTINUED | OUTPATIENT
Start: 2024-12-18 | End: 2024-12-18

## 2024-12-18 RX ORDER — ETOMIDATE 2 MG/ML
INJECTION INTRAVENOUS
Status: DISCONTINUED | OUTPATIENT
Start: 2024-12-18 | End: 2024-12-18

## 2024-12-18 RX ORDER — PROPOFOL 10 MG/ML
INJECTION, EMULSION INTRAVENOUS CONTINUOUS PRN
Status: DISCONTINUED | OUTPATIENT
Start: 2024-12-18 | End: 2024-12-18

## 2024-12-18 RX ORDER — DOXYCYCLINE 100 MG/1
100 CAPSULE ORAL 2 TIMES DAILY
Qty: 10 CAPSULE | Refills: 0 | Status: SHIPPED | OUTPATIENT
Start: 2024-12-18 | End: 2024-12-23

## 2024-12-18 RX ORDER — DEXAMETHASONE SODIUM PHOSPHATE 4 MG/ML
INJECTION, SOLUTION INTRA-ARTICULAR; INTRALESIONAL; INTRAMUSCULAR; INTRAVENOUS; SOFT TISSUE
Status: DISCONTINUED | OUTPATIENT
Start: 2024-12-18 | End: 2024-12-18

## 2024-12-18 RX ORDER — KETAMINE HCL IN 0.9 % NACL 50 MG/5 ML
SYRINGE (ML) INTRAVENOUS
Status: DISCONTINUED | OUTPATIENT
Start: 2024-12-18 | End: 2024-12-18

## 2024-12-18 RX ORDER — PHENYLEPHRINE HYDROCHLORIDE 10 MG/ML
INJECTION INTRAVENOUS
Status: DISCONTINUED | OUTPATIENT
Start: 2024-12-18 | End: 2024-12-18

## 2024-12-18 RX ORDER — CEFAZOLIN 2 G/1
2 INJECTION, POWDER, FOR SOLUTION INTRAMUSCULAR; INTRAVENOUS
Status: DISCONTINUED | OUTPATIENT
Start: 2024-12-18 | End: 2024-12-18 | Stop reason: HOSPADM

## 2024-12-18 RX ORDER — SODIUM CHLORIDE 0.9 G/100ML
IRRIGANT IRRIGATION
Status: DISCONTINUED | OUTPATIENT
Start: 2024-12-18 | End: 2024-12-18 | Stop reason: HOSPADM

## 2024-12-18 RX ORDER — LIDOCAINE HYDROCHLORIDE 20 MG/ML
INJECTION, SOLUTION INFILTRATION; PERINEURAL
Status: DISCONTINUED | OUTPATIENT
Start: 2024-12-18 | End: 2024-12-18 | Stop reason: HOSPADM

## 2024-12-18 RX ORDER — MIDAZOLAM HYDROCHLORIDE 1 MG/ML
INJECTION INTRAMUSCULAR; INTRAVENOUS
Status: DISCONTINUED | OUTPATIENT
Start: 2024-12-18 | End: 2024-12-18

## 2024-12-18 RX ORDER — BUPIVACAINE HYDROCHLORIDE 2.5 MG/ML
INJECTION, SOLUTION EPIDURAL; INFILTRATION; INTRACAUDAL
Status: DISCONTINUED | OUTPATIENT
Start: 2024-12-18 | End: 2024-12-18 | Stop reason: HOSPADM

## 2024-12-18 RX ADMIN — VASOPRESSIN 1 UNITS: 20 INJECTION INTRAVENOUS at 09:12

## 2024-12-18 RX ADMIN — PHENYLEPHRINE HYDROCHLORIDE 100 MCG: 10 INJECTION INTRAVENOUS at 08:12

## 2024-12-18 RX ADMIN — Medication 2 G: at 08:12

## 2024-12-18 RX ADMIN — Medication 10 MG: at 08:12

## 2024-12-18 RX ADMIN — IODIXANOL 10 ML: 320 INJECTION, SOLUTION INTRAVASCULAR at 08:12

## 2024-12-18 RX ADMIN — DEXMEDETOMIDINE 8 MCG: 200 INJECTION, SOLUTION INTRAVENOUS at 10:12

## 2024-12-18 RX ADMIN — PROPOFOL 30 MG: 10 INJECTION, EMULSION INTRAVENOUS at 09:12

## 2024-12-18 RX ADMIN — PHENYLEPHRINE HYDROCHLORIDE 100 MCG: 10 INJECTION INTRAVENOUS at 09:12

## 2024-12-18 RX ADMIN — VASOPRESSIN 2 UNITS: 20 INJECTION INTRAVENOUS at 10:12

## 2024-12-18 RX ADMIN — Medication 20 MG: at 08:12

## 2024-12-18 RX ADMIN — SODIUM CHLORIDE: 9 INJECTION, SOLUTION INTRAVENOUS at 08:12

## 2024-12-18 RX ADMIN — PROPOFOL 20 MG: 10 INJECTION, EMULSION INTRAVENOUS at 08:12

## 2024-12-18 RX ADMIN — DEXMEDETOMIDINE 12 MCG: 200 INJECTION, SOLUTION INTRAVENOUS at 10:12

## 2024-12-18 RX ADMIN — PHENYLEPHRINE HYDROCHLORIDE 100 MCG: 10 INJECTION INTRAVENOUS at 11:12

## 2024-12-18 RX ADMIN — PROPOFOL 50 MCG/KG/MIN: 10 INJECTION, EMULSION INTRAVENOUS at 08:12

## 2024-12-18 RX ADMIN — FENTANYL CITRATE 25 MCG: 50 INJECTION, SOLUTION INTRAMUSCULAR; INTRAVENOUS at 11:12

## 2024-12-18 RX ADMIN — ETOMIDATE 4 MG: 2 INJECTION, SOLUTION INTRAVENOUS at 08:12

## 2024-12-18 RX ADMIN — FENTANYL CITRATE 25 MCG: 50 INJECTION, SOLUTION INTRAMUSCULAR; INTRAVENOUS at 08:12

## 2024-12-18 RX ADMIN — DEXAMETHASONE SODIUM PHOSPHATE 4 MG: 4 INJECTION, SOLUTION INTRAMUSCULAR; INTRAVENOUS at 08:12

## 2024-12-18 RX ADMIN — DEXMEDETOMIDINE 12 MCG: 200 INJECTION, SOLUTION INTRAVENOUS at 08:12

## 2024-12-18 RX ADMIN — ETOMIDATE 6 MG: 2 INJECTION, SOLUTION INTRAVENOUS at 10:12

## 2024-12-18 RX ADMIN — PHENYLEPHRINE HYDROCHLORIDE 200 MCG: 10 INJECTION INTRAVENOUS at 09:12

## 2024-12-18 RX ADMIN — ACETAMINOPHEN 650 MG: 325 TABLET ORAL at 12:12

## 2024-12-18 RX ADMIN — MIDAZOLAM HYDROCHLORIDE 2 MG: 2 INJECTION, SOLUTION INTRAMUSCULAR; INTRAVENOUS at 08:12

## 2024-12-18 RX ADMIN — DEXMEDETOMIDINE 12 MCG: 200 INJECTION, SOLUTION INTRAVENOUS at 09:12

## 2024-12-18 RX ADMIN — Medication 10 MG: at 09:12

## 2024-12-18 RX ADMIN — PHENYLEPHRINE HYDROCHLORIDE 100 MCG: 10 INJECTION INTRAVENOUS at 10:12

## 2024-12-18 RX ADMIN — PHENYLEPHRINE HYDROCHLORIDE 0.4 MCG/KG/MIN: 10 INJECTION INTRAVENOUS at 10:12

## 2024-12-18 RX ADMIN — FENTANYL CITRATE 25 MCG: 50 INJECTION, SOLUTION INTRAMUSCULAR; INTRAVENOUS at 09:12

## 2024-12-18 RX ADMIN — LIDOCAINE HYDROCHLORIDE 40 MG: 20 INJECTION INTRAVENOUS at 08:12

## 2024-12-18 NOTE — SUBJECTIVE & OBJECTIVE
History reviewed. No pertinent past medical history.    Past Surgical History:   Procedure Laterality Date    ECHOCARDIOGRAM,TRANSESOPHAGEAL N/A 10/5/2023    Procedure: Transesophageal echo (MYLES) intra-procedure log documentation;  Surgeon: Frantz Saba MD;  Location: [facility] EP LAB;  Service: Cardiology;  Laterality: N/A;  a fib, myles/dccv, mac, pr, sscu    TREATMENT OF CARDIAC ARRHYTHMIA N/A 10/5/2023    Procedure: Cardioversion or Defibrillation;  Surgeon: LARISSA Ruff MD;  Location: [facility] EP LAB;  Service: Cardiology;  Laterality: N/A;  a fib, myles/dccv, mac, pr, sscu       Review of patient's allergies indicates:  No Known Allergies    Current Facility-Administered Medications on File Prior to Encounter   Medication    sodium chloride 0.9% bolus 1,000 mL 1,000 mL     Current Outpatient Medications on File Prior to Encounter   Medication Sig    amiodarone (PACERONE) 200 MG Tab Take 1 tablet (200 mg total) by mouth once daily.    apixaban (ELIQUIS) 5 mg Tab Take 1 tablet (5 mg total) by mouth 2 (two) times daily.    diazePAM (VALIUM) 2 MG tablet Take 2 mg by mouth 3 (three) times daily as needed.     Family History    None       Tobacco Use    Smoking status: Former     Types: Cigarettes     Passive exposure: Never    Smokeless tobacco: Never   Substance and Sexual Activity    Alcohol use: Not on file    Drug use: Not on file    Sexual activity: Not on file     Review of Systems   All other systems reviewed and are negative.    Objective:     Vital Signs (Most Recent):    Vital Signs (24h Range):  BP: ()/()   Arterial Line BP: ()/()           There is no height or weight on file to calculate BMI.             Physical Exam  Vitals and nursing note reviewed.   Constitutional:       Appearance: Normal appearance.   HENT:      Head: Normocephalic and atraumatic.   Cardiovascular:      Rate and Rhythm: Normal rate and regular rhythm.      Pulses: Normal pulses.      Heart sounds: Normal heart sounds.    Pulmonary:      Effort: Pulmonary effort is normal.      Breath sounds: Normal breath sounds.   Abdominal:      General: Abdomen is flat.      Palpations: Abdomen is soft.   Musculoskeletal:      Right lower leg: No edema.      Left lower leg: No edema.   Skin:     General: Skin is warm.   Neurological:      Mental Status: He is alert.            Significant Labs: All pertinent lab results from the last 24 hours have been reviewed.

## 2024-12-18 NOTE — H&P
[facility] - Short Stay Cardiac Unit  Cardiac Electrophysiology  History and Physical     Admission Date: 12/18/2024  Code Status: No Order   Attending Provider: Rob Galan MD   Principal Problem:Ischemic cardiomyopathy    Subjective:     Chief Complaint:  ICM, Primary prevention     HPI:  49 year-old man with chronic ischemic CMP (LVEF 20%, NYHA class II) s/p MI and CABG on 7/14/2021 complicated by tamponade requiring a pericardial window 2 weeks later, AVNRT s/p slow pathway modification in 2017 by Dr. Celestin, post CABG persistent AF, major GI bleed on aspirin/eliquis in 2022 requiring 4u PRBCs (endoscopy revealed no source) and CKD stage 3a. He has been on GDMT since 10/17/2022. LVEF had not recovered. He was on amiodarone previously. PET stress test noted a small fixed defect. He reports he has been in AF for 1.5 years. He reported he had never had attempt at rhythm control with his atrial fibrillation. We started eliquis in April of 2023 with plan for MINDI/DCCV 1 month later then start amiodarone. Has maintained NSR since cardioversion on amiodarone and EF has also remained low at 25-30% therefore he presents today for implantation of ICD for primary prevention.      10/24: EF 25-30%. 3/2023 ECHO: LVEF 20%, LVEDD 7.5cm (ECHO 7/2022 from outside hospital: LVEF 15-20%, ECHO in 2/2020 LVEF 35-40%)     ECG is sinus rhythm with a left bundle type IVCD (QRS 136ms, has had 154ms in prior ECGs)    History reviewed. No pertinent past medical history.    Past Surgical History:   Procedure Laterality Date    ECHOCARDIOGRAM,TRANSESOPHAGEAL N/A 10/5/2023    Procedure: Transesophageal echo (MINDI) intra-procedure log documentation;  Surgeon: Frantz Saba MD;  Location: [facility] EP LAB;  Service: Cardiology;  Laterality: N/A;  a fib, mindi/dccv, mac, pr, sscu    TREATMENT OF CARDIAC ARRHYTHMIA N/A 10/5/2023    Procedure: Cardioversion or Defibrillation;  Surgeon: LARISSA Ruff MD;  Location: [facility] EP LAB;  Service:  Cardiology;  Laterality: N/A;  a fib, mindi/dccv, mac, pr, sscu       Review of patient's allergies indicates:  No Known Allergies    Current Facility-Administered Medications on File Prior to Encounter   Medication    sodium chloride 0.9% bolus 1,000 mL 1,000 mL     Current Outpatient Medications on File Prior to Encounter   Medication Sig    amiodarone (PACERONE) 200 MG Tab Take 1 tablet (200 mg total) by mouth once daily.    apixaban (ELIQUIS) 5 mg Tab Take 1 tablet (5 mg total) by mouth 2 (two) times daily.    diazePAM (VALIUM) 2 MG tablet Take 2 mg by mouth 3 (three) times daily as needed.     Family History    None       Tobacco Use    Smoking status: Former     Types: Cigarettes     Passive exposure: Never    Smokeless tobacco: Never   Substance and Sexual Activity    Alcohol use: Not on file    Drug use: Not on file    Sexual activity: Not on file     Review of Systems   All other systems reviewed and are negative.    Objective:     Vital Signs (Most Recent):    Vital Signs (24h Range):  BP: ()/()   Arterial Line BP: ()/()           There is no height or weight on file to calculate BMI.             Physical Exam  Vitals and nursing note reviewed.   Constitutional:       Appearance: Normal appearance.   HENT:      Head: Normocephalic and atraumatic.   Cardiovascular:      Rate and Rhythm: Normal rate and regular rhythm.      Pulses: Normal pulses.      Heart sounds: Normal heart sounds.   Pulmonary:      Effort: Pulmonary effort is normal.      Breath sounds: Normal breath sounds.   Abdominal:      General: Abdomen is flat.      Palpations: Abdomen is soft.   Musculoskeletal:      Right lower leg: No edema.      Left lower leg: No edema.   Skin:     General: Skin is warm.   Neurological:      Mental Status: He is alert.            Significant Labs: All pertinent lab results from the last 24 hours have been reviewed.    Assessment and Plan:     * Ischemic cardiomyopathy  In summary, Mr. Lopez is a pleasant 49  year-old man with chronic ischemic CMP (LVEF 20%, NYHA class II) s/p MI and CABG on 7/14/2021 complicated by tamponade requiring a pericardial window 2 weeks later, AVNRT s/p slow pathway modification in 2017 by Dr. Celestin, post CABG long-standing persistent AF, major GI bleed on aspirin/eliquis in 2022 requiring 4u PRBCs and CKD stage 3a. Looking at his case I notice he has been left in AF without any attempt for rhythm control. The nearest ECHO before he went into AF that I have available was from 2020 and his LV function was better. Discussed pathophysiology of AF and possibility that persistent AF could have negatively impacted his LV function. If so it is possible he could have some LV functional recovery with sinus rhythm. However we discussed this would be challenging due to being in persistent AF for over 1 year. Discussed stroke risk with AF ([id number] score 2) and need for anticoagulation. He is now s/p MYLES/DCCV and has maintained sinus rhythm on amiodarone. His EF has remained low (25-30%) despite restoration of sinus rhythm.     Plan: CRT-D    The indication(s), risks, benefits and alternatives of the procedure were explained to the patient, patient's family and/or surrogate decision maker. Risks include (but not limited to) bleeding, pocket site hematoma, pocket and/or device infection which would often require extraction, pain, damage of vascular structures or surrounding structures, myocardial damage [perforation & tamponade requiring pericardiocentesis, valvular damage, injury to conduction system], pneumothorax, CVA, MI, and death. All questions were answered. Patient is understanding of these risks, and wishes to proceed. Consents signed.        Steffany Robert MD  Cardiac Electrophysiology  [facility] - Short Stay Cardiac Unit

## 2024-12-18 NOTE — HPI
49 year-old man with chronic ischemic CMP (LVEF 20%, NYHA class II) s/p MI and CABG on 7/14/2021 complicated by tamponade requiring a pericardial window 2 weeks later, AVNRT s/p slow pathway modification in 2017 by Dr. Celestin, post CABG persistent AF, major GI bleed on aspirin/eliquis in 2022 requiring 4u PRBCs (endoscopy revealed no source) and CKD stage 3a. He has been on GDMT since 10/17/2022. LVEF had not recovered. He was on amiodarone previously. PET stress test noted a small fixed defect. He reports he has been in AF for 1.5 years. He reported he had never had attempt at rhythm control with his atrial fibrillation. We started eliquis in April of 2023 with plan for MYLES/DCCV 1 month later then start amiodarone. Has maintained NSR since cardioversion on amiodarone and EF has also remained low at 25-30% therefore he presents today for implantation of ICD for primary prevention.      10/24: EF 25-30%. 3/2023 ECHO: LVEF 20%, LVEDD 7.5cm (ECHO 7/2022 from outside hospital: LVEF 15-20%, ECHO in 2/2020 LVEF 35-40%)     ECG is sinus rhythm with a left bundle type IVCD (QRS 136ms, has had 154ms in prior ECGs)

## 2024-12-18 NOTE — PROGRESS NOTES
Nursing Transfer Note        Reason patient is being transferred: back to short stay post recovery    Transfer To: short stay 2    Transfer via stretcher    Transfer with cardiac monitoring    Transported by RN    Telemetry: Box Number [id number], Rate 64, and Rhythm NS    Medicines sent: doxy from outpt pharmacy sent w/ pt    Any special needs or follow-up needed: continue bedrest w/ frequent site checks. LUE w/ sling and immobilizer at all times. Bedrest up at 1445.    Patient belongings transferred with patient: Yes- med    Chart send with patient: Yes    Notified: friend    Patient reassessed at: to be done by receiving RN (date, time)

## 2024-12-18 NOTE — DISCHARGE SUMMARY
[facility] - Short Stay Cardiac Unit  Cardiac Electrophysiology  Discharge Summary      Patient Name: Wai Lopez  MRN: [id number]  Admission Date: 12/18/2024  Hospital Length of Stay: 0 days  Discharge Date and Time:  12/18/2024 4:30 PM  Attending Physician: Rob Galan MD    Discharging Provider: Steffany Robert MD  Primary Care Physician: Angelina, Primary Doctor    HPI:   49 year-old man with chronic ischemic CMP (LVEF 20%, NYHA class II) s/p MI and CABG on 7/14/2021 complicated by tamponade requiring a pericardial window 2 weeks later, AVNRT s/p slow pathway modification in 2017 by Dr. Celestin, post CABG persistent AF, major GI bleed on aspirin/eliquis in 2022 requiring 4u PRBCs (endoscopy revealed no source) and CKD stage 3a. He has been on GDMT since 10/17/2022. LVEF had not recovered. He was on amiodarone previously. PET stress test noted a small fixed defect. He reports he has been in AF for 1.5 years. He reported he had never had attempt at rhythm control with his atrial fibrillation. We started eliquis in April of 2023 with plan for MYLES/DCCV 1 month later then start amiodarone. Has maintained NSR since cardioversion on amiodarone and EF has also remained low at 25-30% therefore he presents today for implantation of ICD for primary prevention.      10/24: EF 25-30%. 3/2023 ECHO: LVEF 20%, LVEDD 7.5cm (ECHO 7/2022 from outside hospital: LVEF 15-20%, ECHO in 2/2020 LVEF 35-40%)     ECG is sinus rhythm with a left bundle type IVCD (QRS 136ms, has had 154ms in prior ECGs)    Procedure(s) (LRB):  INSERTION, ICD, BIVENTRICULAR (Left)     Indwelling Lines/Drains at time of discharge:  Lines/Drains/Airways       None                   Hospital Course:  Patient underwent successful implantation of dual chamber ICD for SCD primary prevention without evidence of complications intra- and post-procedure. CXR shows appropriate lead placement without acute cardiopulmonary process. ECG without evidence of device  malfunction.     EP medications at discharge:  Initiation of doxycyline 100 mg BID x 5 days.  Patient was instructed to hold their anticoagulation for 5 days (when they complete their antibiotics)     Patient given activity restrictions and warning signs/symptoms to monitor for, including chest pain, shortness of breath, fevers, or evidence of complications at the generator site [swelling, drainage, redness, tenderness, or warmth]. They were instructed to seek immediate medical attention if these occur and to contract the EP department. Follow up with device clinic in 1 week. No CP, SOB, or complications at the generator site on discharge. All questions and concerns answered prior to discharge.     --------------------------------------------------------------------------------     Goals of Care Treatment Preferences:  Code Status: Full Code      Consults:     Significant Diagnostic Studies: N/A    Pending Diagnostic Studies:       None            Final Active Diagnoses:    Diagnosis Date Noted POA    PRINCIPAL PROBLEM:  Ischemic cardiomyopathy [I25.5] 10/17/2022 Yes      Problems Resolved During this Admission:     No new Assessment & Plan notes have been filed under this hospital service since the last note was generated.  Service: Arrhythmia      Discharged Condition: stable    Disposition:     Follow Up:   Follow-up Information       DEVICE CHECK CLINIC Follow up in 1 week(s).               Rob Galan MD Follow up in 4 month(s).    Specialties: Electrophysiology, Cardiology  Contact information:  [address] [place] [address] [id number] [phone]                           Patient Instructions:   No discharge procedures on file.  Medications:  Reconciled Home Medications:      Medication List        START taking these medications      doxycycline 100 MG Cap  Commonly known as: VIBRAMYCIN  Take 1 capsule (100 mg total) by mouth 2 (two) times daily. for 5 days            CONTINUE taking these medications       amiodarone 200 MG Tab  Commonly known as: PACERONE  Take 1 tablet (200 mg total) by mouth once daily.     apixaban 5 mg Tab  Commonly known as: ELIQUIS  Take 1 tablet (5 mg total) by mouth 2 (two) times daily.     atorvastatin 40 MG tablet  Commonly known as: LIPITOR  Take 1 tablet (40 mg total) by mouth once daily.     carvediloL 25 MG tablet  Commonly known as: COREG  Take 1 tablet (25 mg total) by mouth 2 (two) times daily with meals.     dapagliflozin propanediol 10 mg tablet  Commonly known as: FARXIGA  Take 1 tablet (10 mg total) by mouth once daily.     diazePAM 2 MG tablet  Commonly known as: VALIUM  Take 2 mg by mouth 3 (three) times daily as needed.     ENTRESTO  mg per tablet  Generic drug: sacubitriL-valsartan  Take 1 tablet by mouth 2 (two) times daily.     EScitalopram oxalate 20 MG tablet  Commonly known as: LEXAPRO  Take 1 tablet (20 mg total) by mouth once daily.     ferrous sulfate 325 (65 FE) MG EC tablet  Take 1 tablet (325 mg total) by mouth once daily.     furosemide 40 MG tablet  Commonly known as: LASIX  Take 1 tablet (40 mg total) by mouth as needed (for weight gain >3 lbs in one day or >5lbs in one week).     hydrALAZINE 25 MG tablet  Commonly known as: APRESOLINE  Take 1 tablet (25 mg total) by mouth 2 (two) times a day.     isosorbide mononitrate 30 MG 24 hr tablet  Commonly known as: IMDUR  Take 2 tablets (60 mg total) by mouth once daily.     nitroGLYCERIN 0.4 MG SL tablet  Commonly known as: NITROSTAT  Place 1 tablet (0.4 mg total) under the tongue every 5 (five) minutes as needed for Chest pain.     ranolazine 500 MG Tb12  Commonly known as: RANEXA  Take 1 tablet (500 mg total) by mouth 2 (two) times daily.     spironolactone 25 MG tablet  Commonly known as: ALDACTONE  Take 1 tablet (25 mg total) by mouth once daily.              Time spent on the discharge of patient: 45 minutes    Steffany Robert MD  Cardiac Electrophysiology  [facility] - Short Stay Cardiac Unit

## 2024-12-18 NOTE — PROGRESS NOTES
Patient arrived to EP PACU in stable condition. Report received from procedural RN and anesthesia provider. L upper chest incision w/ dermabond EVELYN, no bleeding or hematoma noted. LUE immobilizer in place. Continuous cardiac monitoring in place. Safety measures verified. PACU BCGs maintained. RN at bedside. Will continue to monitor.

## 2024-12-18 NOTE — Clinical Note
The lead was inserted under fluorscopic guidance. The lead was inserted in the other location.  left bundle branch

## 2024-12-18 NOTE — NURSING
Patient discharged home. Patient alert and oriented x4. Patient follows all commands appropriately. Left chest site intact, no signs of bleeding or hematoma noted. Pressure dressing/mepilex in place. Patient notified to remove in 24 hours. Discharge instructions provided to patient and spouse. Patient and spouse verbalize understanding. Doxycyline delivered to bedside. Left arm in slin/immobilizer. Patient denies pain/discomfort. PIVs removed. Vital signs within normal limits. Patient in wheelchair and escorted to discharge with belongings.

## 2024-12-18 NOTE — ASSESSMENT & PLAN NOTE
In summary, Mr. Lopez is a pleasant 49 year-old man with chronic ischemic CMP (LVEF 20%, NYHA class II) s/p MI and CABG on 7/14/2021 complicated by tamponade requiring a pericardial window 2 weeks later, AVNRT s/p slow pathway modification in 2017 by Dr. Celestin, post CABG long-standing persistent AF, major GI bleed on aspirin/eliquis in 2022 requiring 4u PRBCs and CKD stage 3a. Looking at his case I notice he has been left in AF without any attempt for rhythm control. The nearest ECHO before he went into AF that I have available was from 2020 and his LV function was better. Discussed pathophysiology of AF and possibility that persistent AF could have negatively impacted his LV function. If so it is possible he could have some LV functional recovery with sinus rhythm. However we discussed this would be challenging due to being in persistent AF for over 1 year. Discussed stroke risk with AF ([id number] score 2) and need for anticoagulation. He is now s/p MYLES/DCCV and has maintained sinus rhythm on amiodarone. His EF has remained low (25-30%) despite restoration of sinus rhythm.     Plan: CRT-D    The indication(s), risks, benefits and alternatives of the procedure were explained to the patient, patient's family and/or surrogate decision maker. Risks include (but not limited to) bleeding, pocket site hematoma, pocket and/or device infection which would often require extraction, pain, damage of vascular structures or surrounding structures, myocardial damage [perforation & tamponade requiring pericardiocentesis, valvular damage, injury to conduction system], pneumothorax, CVA, MI, and death. All questions were answered. Patient is understanding of these risks, and wishes to proceed. Consents signed.

## 2024-12-18 NOTE — Clinical Note
A dressing was applied to the incision. Mepilex dressing applied 30 mins after dermabond dries followed with pressure dressing

## 2024-12-18 NOTE — DISCHARGE INSTRUCTIONS
Medication instructions:    Complete your 5 days of antibiotics  If you are on a blood thinner (examples: apixiban [Eliquis], rivaroxaban [Xarelto], dabigatran [Pradaxa], or enoxaparin [Lovenox]), do not take your blood thinner for the next 5 days after your procedure. You can resume after 5 days post-procedure (i.e., when you complete your antibiotics). If you are on Coumadin you can continue to take it the day of your procedure  Pain control: you can take up to Tylenol 1000 mg every 6 hours as needed or (if you do not have kidney disease) ibuprofen 600 mg every 6 hours as needed as needed for pain control (if you do not have kidney disease). If unsure, please contact your primary care physician for recommendations.       Activity restrictions & precautions:    Sling & arm instructions:  Wear your sling for 48 hours. After 48 hours, you can take your arm out of your sling.   You must wear your sling at night when you sleep for 6 weeks after your procedure  Do not lift >10 lbs for 2 weeks.  Do not raise your elbow above your shoulder on the same side as your device for 6 weeks.      Surgical site   Dressing: if you have a bandage over your surgical site remove it the morning after your procedure  Do not place any creams or ointments over the surgical site. This could effect the integrity of the Dermabond glue.  You can shower after 24 hours post-procedure, but do not let the jet directly hit your pocket site for at least 2 weeks. Recommend showering with your back to the shower head. Recommend purchasing large water proof bandages from [employer] and placing them over the surgical site while showering for the first 2 weeks. Make sure the sticky part of the bandage does not make contact with the glue over your surgical site as this will remove the glue when you remove the bandage. Make sure to remove the bandage after you shower, and the surgical site is dried off completely after you shower. Do not reach your  arm (on the same side as your device) around your back during showering for 6 weeks.  Do not submerge your surgical incision site under water (swimming, bathing if you submerge the actual surgical site) for at least 6 week. It is imperative that the surgical site is completely healed prior to doing so     Follow up in device clinic in 1 week to check your incision and device function    Please contact the electrophysiology clinic if you have any questions or if you experience: potential surgical/pocket site complications (pain, swelling, bleeding, drainage), fevers, chest pain/shortness of breath, or for any other concerns.

## 2024-12-18 NOTE — PLAN OF CARE
Received report from [facility] EP PACU. Pt is s/p BI-V placement. Pt is aaox4. Vss resp even and unlabored. Bed locked and in low position. Side rails raised x 2. Post procedure protocol reviewed with patient. Understanding verbalized. Nurse call bell within reach. Will monitor

## 2024-12-18 NOTE — BRIEF OP NOTE
Attending: Rob Galan MD  Date of Procedure: 12/18/2024    Post-operative Diagnosis: ICM    Procedure Performed: Dual chamber ICD implantation    Description of Procedure: The patient was brought to the EP lab in the fasting state. Prepped and draped in sterile fashion. Safety timeout was performed. Sedation administered by anesthesia staff. Selective venogram of the left axillary and cephalic veins performed via left ac IV. Lidocaine used for local anesthetic. Fluoroscopic guided axillary access utilized. Guide/J Wire advanced and confirmed in IVC. Incision made. Blunt and electrocautery dissection performed. Pocket made.  Second fluoroscopic guided axillary access obtained. Guide/J wire advanced and confirmed in IVC. Peel away sheath advanced over J wire. ICD lead advanced into RV. R waves and injury pattern adequate. Lead fixed into place and sutured in place. Second peel away sheath advanced over J wire. RA lead advanced into RA via peel away sheath. After adequate p waves and current of injury detected, the RA lead was fixed into place in the RA appendage. Peel away sheath removed and RA lead sutured into place. Pocket washed using antibiotic solution. Leads connected to generator. Generator placed into pocket, sutured in place, and washed with antibiotic solution. Deep layer closed with interrupted 3-0 suture. Intermediate layer closed with running 3-0 suture. Superficial layer closed with running 4-0 suture. Skin closed with Dermabond. Meplex dressing to be placed after dermabond has dried.     EBL: <10 mL    Specimens: None  Complications: no immediate    Post-CIED implantation instructions:  Doxycycline 100 mg BID x 5 days   Avoid all heparin products (e.g., DOACs, enoxaparin, heparin) for 5 days following implant. If the patient is taking coumadin, this can be continued uninterrupted  CXR & ECG (ordered)  Mepilex (square light brown bandage) should be removed the morning after the device implant by  patient (if discharged home) or EP (if hospitalized). Patient can shower after the Mepilex dressing is removed  Sling to arm for first 48 hours continuously, then only nightly for 6 weeks  No lifting elbow above shoulder height on arm ipsilateral to implant device for 6 weeks  No lifting over 5 lbs. for 2 weeks with arm ipsilateral to implanted device  No driving for 1 week if patient uses arm contralateral to implantation and 4 weeks if patient uses arm ipsilateral to implantation  Patient can shower starting post-procedure day 1. Do not submerge surgical site for 1 month (e.g., bathing or swimming)  Patient will be scheduled for device clinic follow up in 1 week (if has already scheduled) to assess surgical site and device interrogation  Please contact EP for any questions or concerns at [phone] or page EP fellow on call  Patient is to seek immediate medical attention for acute onset of chest pain, shortness of breath, syncope, or evidence of surgical site infection or hematoma.    Plan for discharge following bedrest if patient tolerating PO intake, voiding, and ambulatory without evidence of complications     The attending physician was present for entire duration of the procedure    Steffany Robert MD, PGY8  Electrophysiology

## 2024-12-18 NOTE — Clinical Note
A venogram was performed in the coronary sinus. A balloon was inserted. The vessel was injected via hand injection  with 9 mL of contrast. The balloon was removed.

## 2024-12-18 NOTE — Clinical Note
A venogram was performed in the left axillary vein. The vessel was injected via hand injection  with 10 mL of contrast. Given per CRNA

## 2024-12-18 NOTE — TRANSFER OF CARE
"Anesthesia Transfer of Care Note    Patient: Wai Lopez    Procedure(s) Performed: Procedure(s) (LRB):  INSERTION, ICD, BIVENTRICULAR (Left)    Patient location: PACU    Anesthesia Type: general    Transport from OR: Transported from OR on 6-10 L/min O2 by face mask with adequate spontaneous ventilation    Post pain: adequate analgesia    Post assessment: no apparent anesthetic complications and tolerated procedure well    Post vital signs: stable    Level of consciousness: awake and alert    Nausea/Vomiting: no nausea/vomiting    Complications: none    Transfer of care protocol was followedComments: Nurse at bedside, VSS, spont reg resp noted      Last vitals: Visit Vitals  BP (!) 152/100 (BP Location: Right arm, Patient Position: Lying)   Pulse 75   Temp 37 °C (98.6 °F) (Temporal)   Resp 18   Ht 5' 11" (1.803 m)   Wt 114.3 kg (252 lb)   SpO2 97%   BMI 35.15 kg/m²     "

## 2024-12-18 NOTE — Clinical Note
Patient's  informed per Dr. Miguel: No, what I advised at 7/2/19 office visit was that patient had a complex medical history and previous provider determined to stop Rx'ing narcotics due to concern of UDS not showing Rx'd medication, and I wanted a few days to review her records/history and determine ongoing plan of care -- this is still in process and we'll contact patient early next week with recommendations.     The wire was removed from the  left axillary vein.

## 2024-12-18 NOTE — HOSPITAL COURSE
Patient underwent successful implantation of dual chamber ICD for SCD primary prevention without evidence of complications intra- and post-procedure. CXR shows appropriate lead placement without acute cardiopulmonary process. ECG without evidence of device malfunction.     EP medications at discharge:  Initiation of doxycyline 100 mg BID x 5 days.  Patient was instructed to hold their anticoagulation for 5 days (when they complete their antibiotics)     Patient given activity restrictions and warning signs/symptoms to monitor for, including chest pain, shortness of breath, fevers, or evidence of complications at the generator site [swelling, drainage, redness, tenderness, or warmth]. They were instructed to seek immediate medical attention if these occur and to contract the EP department. Follow up with device clinic in 1 week. No CP, SOB, or complications at the generator site on discharge. All questions and concerns answered prior to discharge.     --------------------------------------------------------------------------------

## 2024-12-18 NOTE — NURSING
Patient ambulating around nurses station without difficulties. Left chest site intact, no signs of bleeding or hematoma noted. Left arm in sling/immobilizer. Patient denies pain/ discomfort. Plan of care ongoing.

## 2024-12-19 NOTE — ANESTHESIA POSTPROCEDURE EVALUATION
Anesthesia Post Evaluation    Patient: Wai Lopez    Procedure(s) Performed: Procedure(s) (LRB):  INSERTION, ICD, BIVENTRICULAR (Left)    Final Anesthesia Type: general      Patient location during evaluation: PACU  Patient participation: Yes- Able to Participate  Level of consciousness: awake and alert and oriented  Post-procedure vital signs: reviewed and stable  Pain management: adequate  Airway patency: patent    PONV status at discharge: No PONV  Anesthetic complications: no      Cardiovascular status: blood pressure returned to baseline  Respiratory status: unassisted, room air and spontaneous ventilation  Hydration status: euvolemic  Follow-up not needed.              Vitals Value Taken Time   /88 12/18/24 1500   Temp 36.5 °C (97.7 °F) 12/18/24 1500   Pulse 61 12/18/24 1501   Resp 16 12/18/24 1500   SpO2 98 % 12/18/24 1500         No case tracking events are documented in the log.      Pain/Jayson Score: Pain Rating Prior to Med Admin: 3 (12/18/2024 12:56 PM)  Jayson Score: 10 (12/18/2024  2:00 PM)

## 2024-12-26 ENCOUNTER — CLINICAL SUPPORT (OUTPATIENT)
Dept: CARDIOLOGY | Facility: HOSPITAL | Age: 49
End: 2024-12-26
Attending: INTERNAL MEDICINE
Payer: MEDICARE

## 2024-12-26 DIAGNOSIS — I44.7 LEFT BUNDLE BRANCH BLOCK (LBBB): ICD-10-CM

## 2024-12-26 DIAGNOSIS — I50.42 CHRONIC COMBINED SYSTOLIC AND DIASTOLIC HEART FAILURE: ICD-10-CM

## 2025-01-18 ENCOUNTER — CLINICAL SUPPORT (OUTPATIENT)
Dept: CARDIOLOGY | Facility: HOSPITAL | Age: 50
End: 2025-01-18
Attending: INTERNAL MEDICINE
Payer: MEDICARE

## 2025-01-18 DIAGNOSIS — I44.7 LEFT BUNDLE-BRANCH BLOCK, UNSPECIFIED: ICD-10-CM

## 2025-01-18 DIAGNOSIS — I50.42 CHRONIC COMBINED SYSTOLIC (CONGESTIVE) AND DIASTOLIC (CONGESTIVE) HEART FAILURE: ICD-10-CM

## 2025-01-18 PROCEDURE — 93296 REM INTERROG EVL PM/IDS: CPT | Performed by: INTERNAL MEDICINE

## 2025-01-18 PROCEDURE — 93295 DEV INTERROG REMOTE 1/2/MLT: CPT | Mod: ,,, | Performed by: INTERNAL MEDICINE

## 2025-01-27 LAB
OHS CV AF BURDEN PERCENT: < 1
OHS CV DC REMOTE DEVICE TYPE: NORMAL
OHS CV ICD SHOCK: NO
OHS CV RV PACING PERCENT: 0.09 %

## 2025-03-25 ENCOUNTER — TELEPHONE (OUTPATIENT)
Dept: ELECTROPHYSIOLOGY | Facility: CLINIC | Age: 50
End: 2025-03-25
Payer: MEDICARE

## 2025-03-26 ENCOUNTER — TELEPHONE (OUTPATIENT)
Dept: ELECTROPHYSIOLOGY | Facility: CLINIC | Age: 50
End: 2025-03-26
Payer: MEDICARE

## 2025-04-19 ENCOUNTER — CLINICAL SUPPORT (OUTPATIENT)
Dept: CARDIOLOGY | Facility: HOSPITAL | Age: 50
End: 2025-04-19
Payer: MEDICARE

## 2025-04-19 ENCOUNTER — CLINICAL SUPPORT (OUTPATIENT)
Dept: CARDIOLOGY | Facility: HOSPITAL | Age: 50
End: 2025-04-19
Attending: INTERNAL MEDICINE
Payer: MEDICARE

## 2025-04-19 DIAGNOSIS — I50.42 CHRONIC COMBINED SYSTOLIC (CONGESTIVE) AND DIASTOLIC (CONGESTIVE) HEART FAILURE: ICD-10-CM

## 2025-04-19 DIAGNOSIS — I44.7 LEFT BUNDLE-BRANCH BLOCK, UNSPECIFIED: ICD-10-CM

## 2025-04-19 PROCEDURE — 93296 REM INTERROG EVL PM/IDS: CPT | Performed by: INTERNAL MEDICINE

## 2025-04-19 PROCEDURE — 93295 DEV INTERROG REMOTE 1/2/MLT: CPT | Mod: ,,, | Performed by: INTERNAL MEDICINE

## 2025-04-28 LAB
OHS CV AF BURDEN PERCENT: 14.6
OHS CV DC REMOTE DEVICE TYPE: NORMAL
OHS CV ICD SHOCK: NO
OHS CV RV PACING PERCENT: 5.72 %

## 2025-04-29 ENCOUNTER — TELEPHONE (OUTPATIENT)
Dept: ELECTROPHYSIOLOGY | Facility: CLINIC | Age: 50
End: 2025-04-29
Payer: MEDICARE

## 2025-04-29 NOTE — TELEPHONE ENCOUNTER
Remote alert for AFL > 48 hrs, v rates controlled  [place] 100% since 4/18/2025    On Eliquis, Coreg 25 mg po BID and Amiodarone 200mg po daily       Asymptomatic   Will forward to Dr. Galan  Pt due for 3 mos post implant follow up.        ___

## 2025-05-22 ENCOUNTER — TELEPHONE (OUTPATIENT)
Dept: TRANSPLANT | Facility: CLINIC | Age: 50
End: 2025-05-22
Payer: MEDICARE

## 2025-07-01 ENCOUNTER — TELEPHONE (OUTPATIENT)
Dept: ELECTROPHYSIOLOGY | Facility: CLINIC | Age: 50
End: 2025-07-01
Payer: MEDICARE

## 2025-07-19 ENCOUNTER — CLINICAL SUPPORT (OUTPATIENT)
Dept: CARDIOLOGY | Facility: HOSPITAL | Age: 50
End: 2025-07-19
Payer: MEDICARE

## 2025-07-19 ENCOUNTER — CLINICAL SUPPORT (OUTPATIENT)
Dept: CARDIOLOGY | Facility: HOSPITAL | Age: 50
End: 2025-07-19
Attending: INTERNAL MEDICINE
Payer: MEDICARE

## 2025-07-19 DIAGNOSIS — I50.42 CHRONIC COMBINED SYSTOLIC (CONGESTIVE) AND DIASTOLIC (CONGESTIVE) HEART FAILURE: ICD-10-CM

## 2025-07-19 DIAGNOSIS — I44.7 LEFT BUNDLE-BRANCH BLOCK, UNSPECIFIED: ICD-10-CM

## 2025-07-19 PROCEDURE — 93295 DEV INTERROG REMOTE 1/2/MLT: CPT | Mod: ,,, | Performed by: INTERNAL MEDICINE

## 2025-07-19 PROCEDURE — 93296 REM INTERROG EVL PM/IDS: CPT | Performed by: INTERNAL MEDICINE

## 2025-07-31 LAB
OHS CV AF BURDEN PERCENT: 100
OHS CV DC REMOTE DEVICE TYPE: NORMAL
OHS CV ICD SHOCK: NO
OHS CV RV PACING PERCENT: 12.2 %

## 2025-09-04 ENCOUNTER — TELEPHONE (OUTPATIENT)
Dept: ELECTROPHYSIOLOGY | Facility: CLINIC | Age: 50
End: 2025-09-04
Payer: MEDICARE

## (undated) DEVICE — BANDAGE ELASTOPLAST 3INX5YDS

## (undated) DEVICE — WIRE WHISPER MS 014 X 190

## (undated) DEVICE — LEAD 429888 MRI CANT US
Type: IMPLANTABLE DEVICE | Site: HEART | Status: NON-FUNCTIONAL
Brand: ATTAIN PERFORMA™ MRI SURESCAN™
Removed: 2024-12-18

## (undated) DEVICE — INTRODUCER PRELUDESNAP 7F 13CM

## (undated) DEVICE — VISIPAQUE 320 200ML +PK

## (undated) DEVICE — CATH ATTAIN COMMAND ACCESSORY

## (undated) DEVICE — LEAD 3830 US MKT/ 69CM MRI LBBAP
Type: IMPLANTABLE DEVICE | Site: HEART | Status: NON-FUNCTIONAL
Brand: SELECTSECURE™ MRI SURESCAN™
Removed: 2024-12-18

## (undated) DEVICE — CATH BALLOON

## (undated) DEVICE — DRESSING AQUACEL FOAM 5 X 5

## (undated) DEVICE — WIRE GUIDE WHOLEY MOD J .035

## (undated) DEVICE — PENCIL SMK EVAC CONNECTOR 10FT

## (undated) DEVICE — PACK PACER PERMANENT OMC

## (undated) DEVICE — SHEATH PRELUDE 9X13CM SNAP

## (undated) DEVICE — KIT SELECTRA ACCESSORY

## (undated) DEVICE — GAUZE SPONGE 4X4 12PLY

## (undated) DEVICE — ELECTRODE REM PLYHSV RETURN 9

## (undated) DEVICE — SHEATH SELECTRA 65MM 42CM

## (undated) DEVICE — PAD DEFIB CADENCE ADULT R2

## (undated) DEVICE — DRAPE INCISE IOBAN 2 23X17IN

## (undated) DEVICE — ADHESIVE DERMABOND ADVANCED

## (undated) DEVICE — VISIPAQUE CONTRAST 320MG/100ML

## (undated) DEVICE — GUIDEWIRE ATTAIN HYBRID 108CM